# Patient Record
Sex: MALE | Race: WHITE | NOT HISPANIC OR LATINO | Employment: OTHER | URBAN - METROPOLITAN AREA
[De-identification: names, ages, dates, MRNs, and addresses within clinical notes are randomized per-mention and may not be internally consistent; named-entity substitution may affect disease eponyms.]

---

## 2022-10-01 ENCOUNTER — APPOINTMENT (OUTPATIENT)
Dept: CARDIOLOGY | Facility: HOSPITAL | Age: 67
End: 2022-10-01

## 2022-10-01 ENCOUNTER — HOSPITAL ENCOUNTER (EMERGENCY)
Facility: HOSPITAL | Age: 67
Discharge: HOME | End: 2022-10-01
Attending: EMERGENCY MEDICINE | Admitting: EMERGENCY MEDICINE

## 2022-10-01 VITALS
SYSTOLIC BLOOD PRESSURE: 139 MMHG | BODY MASS INDEX: 23.62 KG/M2 | HEART RATE: 63 BPM | WEIGHT: 190 LBS | TEMPERATURE: 98.1 F | RESPIRATION RATE: 18 BRPM | DIASTOLIC BLOOD PRESSURE: 76 MMHG | HEIGHT: 75 IN | OXYGEN SATURATION: 95 %

## 2022-10-01 DIAGNOSIS — I80.03 THROMBOPHLEBITIS OF SUPERFICIAL VEINS OF BOTH LOWER EXTREMITIES: Primary | ICD-10-CM

## 2022-10-01 DIAGNOSIS — E11.65 POORLY CONTROLLED DIABETES MELLITUS: ICD-10-CM

## 2022-10-01 DIAGNOSIS — E86.0 DEHYDRATION: ICD-10-CM

## 2022-10-01 LAB
ALBUMIN SERPL-MCNC: 3.9 G/DL (ref 3.5–5.2)
ALBUMIN/GLOB SERPL: 1.3 G/DL
ALP SERPL-CCNC: 133 U/L (ref 39–117)
ALT SERPL W P-5'-P-CCNC: 9 U/L (ref 1–41)
ANION GAP SERPL CALCULATED.3IONS-SCNC: 9 MMOL/L (ref 5–15)
AST SERPL-CCNC: 13 U/L (ref 1–40)
BASOPHILS # BLD AUTO: 0.08 10*3/MM3 (ref 0–0.2)
BASOPHILS NFR BLD AUTO: 1 % (ref 0–1.5)
BH CV LOW VAS LEFT LESSER SAPH VESSEL: 1
BH CV LOW VAS RIGHT GREATER SAPH BK VESSEL: 1
BH CV LOW VAS RIGHT LESSER SAPH VESSEL: 1
BH CV LOWER VASCULAR LEFT COMMON FEMORAL AUGMENT: NORMAL
BH CV LOWER VASCULAR LEFT COMMON FEMORAL COMPRESS: NORMAL
BH CV LOWER VASCULAR LEFT COMMON FEMORAL PHASIC: NORMAL
BH CV LOWER VASCULAR LEFT COMMON FEMORAL SPONT: NORMAL
BH CV LOWER VASCULAR LEFT DISTAL FEMORAL AUGMENT: NORMAL
BH CV LOWER VASCULAR LEFT DISTAL FEMORAL COMPRESS: NORMAL
BH CV LOWER VASCULAR LEFT DISTAL FEMORAL PHASIC: NORMAL
BH CV LOWER VASCULAR LEFT DISTAL FEMORAL SPONT: NORMAL
BH CV LOWER VASCULAR LEFT GASTRONEMIUS COMPRESS: NORMAL
BH CV LOWER VASCULAR LEFT GREATER SAPH AK COMPRESS: NORMAL
BH CV LOWER VASCULAR LEFT GREATER SAPH BK COMPRESS: NORMAL
BH CV LOWER VASCULAR LEFT LESSER SAPH COMPRESS: NORMAL
BH CV LOWER VASCULAR LEFT LESSER SAPH THROMBUS: NORMAL
BH CV LOWER VASCULAR LEFT MID FEMORAL AUGMENT: NORMAL
BH CV LOWER VASCULAR LEFT MID FEMORAL COMPRESS: NORMAL
BH CV LOWER VASCULAR LEFT MID FEMORAL PHASIC: NORMAL
BH CV LOWER VASCULAR LEFT MID FEMORAL SPONT: NORMAL
BH CV LOWER VASCULAR LEFT PERONEAL COMPRESS: NORMAL
BH CV LOWER VASCULAR LEFT POPLITEAL AUGMENT: NORMAL
BH CV LOWER VASCULAR LEFT POPLITEAL COMPRESS: NORMAL
BH CV LOWER VASCULAR LEFT POPLITEAL PHASIC: NORMAL
BH CV LOWER VASCULAR LEFT POPLITEAL SPONT: NORMAL
BH CV LOWER VASCULAR LEFT POSTERIOR TIBIAL COMPRESS: NORMAL
BH CV LOWER VASCULAR LEFT PROFUNDA FEMORAL AUGMENT: NORMAL
BH CV LOWER VASCULAR LEFT PROFUNDA FEMORAL COMPRESS: NORMAL
BH CV LOWER VASCULAR LEFT PROFUNDA FEMORAL PHASIC: NORMAL
BH CV LOWER VASCULAR LEFT PROFUNDA FEMORAL SPONT: NORMAL
BH CV LOWER VASCULAR LEFT PROXIMAL FEMORAL AUGMENT: NORMAL
BH CV LOWER VASCULAR LEFT PROXIMAL FEMORAL COMPRESS: NORMAL
BH CV LOWER VASCULAR LEFT PROXIMAL FEMORAL PHASIC: NORMAL
BH CV LOWER VASCULAR LEFT PROXIMAL FEMORAL SPONT: NORMAL
BH CV LOWER VASCULAR LEFT SAPHENOFEMORAL JUNCTION AUGMENT: NORMAL
BH CV LOWER VASCULAR LEFT SAPHENOFEMORAL JUNCTION COMPRESS: NORMAL
BH CV LOWER VASCULAR LEFT SAPHENOFEMORAL JUNCTION PHASIC: NORMAL
BH CV LOWER VASCULAR LEFT SAPHENOFEMORAL JUNCTION SPONT: NORMAL
BH CV LOWER VASCULAR LEFT SOLEAL COMPRESS: NORMAL
BH CV LOWER VASCULAR RIGHT COMMON FEMORAL AUGMENT: NORMAL
BH CV LOWER VASCULAR RIGHT COMMON FEMORAL COMPRESS: NORMAL
BH CV LOWER VASCULAR RIGHT COMMON FEMORAL PHASIC: NORMAL
BH CV LOWER VASCULAR RIGHT COMMON FEMORAL SPONT: NORMAL
BH CV LOWER VASCULAR RIGHT DISTAL FEMORAL AUGMENT: NORMAL
BH CV LOWER VASCULAR RIGHT DISTAL FEMORAL COMPRESS: NORMAL
BH CV LOWER VASCULAR RIGHT DISTAL FEMORAL PHASIC: NORMAL
BH CV LOWER VASCULAR RIGHT DISTAL FEMORAL SPONT: NORMAL
BH CV LOWER VASCULAR RIGHT GASTRONEMIUS COMPRESS: NORMAL
BH CV LOWER VASCULAR RIGHT GREATER SAPH AK COMPRESS: NORMAL
BH CV LOWER VASCULAR RIGHT GREATER SAPH BK COMPRESS: NORMAL
BH CV LOWER VASCULAR RIGHT GREATER SAPH BK THROMBUS: NORMAL
BH CV LOWER VASCULAR RIGHT LESSER SAPH COMPRESS: NORMAL
BH CV LOWER VASCULAR RIGHT LESSER SAPH THROMBUS: NORMAL
BH CV LOWER VASCULAR RIGHT MID FEMORAL AUGMENT: NORMAL
BH CV LOWER VASCULAR RIGHT MID FEMORAL COMPRESS: NORMAL
BH CV LOWER VASCULAR RIGHT MID FEMORAL PHASIC: NORMAL
BH CV LOWER VASCULAR RIGHT MID FEMORAL SPONT: NORMAL
BH CV LOWER VASCULAR RIGHT PERONEAL COMPRESS: NORMAL
BH CV LOWER VASCULAR RIGHT POPLITEAL AUGMENT: NORMAL
BH CV LOWER VASCULAR RIGHT POPLITEAL COMPRESS: NORMAL
BH CV LOWER VASCULAR RIGHT POPLITEAL PHASIC: NORMAL
BH CV LOWER VASCULAR RIGHT POPLITEAL SPONT: NORMAL
BH CV LOWER VASCULAR RIGHT POSTERIOR TIBIAL COMPRESS: NORMAL
BH CV LOWER VASCULAR RIGHT PROFUNDA FEMORAL AUGMENT: NORMAL
BH CV LOWER VASCULAR RIGHT PROFUNDA FEMORAL COMPRESS: NORMAL
BH CV LOWER VASCULAR RIGHT PROFUNDA FEMORAL PHASIC: NORMAL
BH CV LOWER VASCULAR RIGHT PROFUNDA FEMORAL SPONT: NORMAL
BH CV LOWER VASCULAR RIGHT PROXIMAL FEMORAL AUGMENT: NORMAL
BH CV LOWER VASCULAR RIGHT PROXIMAL FEMORAL COMPRESS: NORMAL
BH CV LOWER VASCULAR RIGHT PROXIMAL FEMORAL PHASIC: NORMAL
BH CV LOWER VASCULAR RIGHT PROXIMAL FEMORAL SPONT: NORMAL
BH CV LOWER VASCULAR RIGHT SAPHENOFEMORAL JUNCTION AUGMENT: NORMAL
BH CV LOWER VASCULAR RIGHT SAPHENOFEMORAL JUNCTION COMPRESS: NORMAL
BH CV LOWER VASCULAR RIGHT SAPHENOFEMORAL JUNCTION PHASIC: NORMAL
BH CV LOWER VASCULAR RIGHT SAPHENOFEMORAL JUNCTION SPONT: NORMAL
BH CV LOWER VASCULAR RIGHT SOLEAL COMPRESS: NORMAL
BILIRUB SERPL-MCNC: 0.6 MG/DL (ref 0–1.2)
BUN SERPL-MCNC: 16 MG/DL (ref 8–23)
BUN/CREAT SERPL: 9.5 (ref 7–25)
CALCIUM SPEC-SCNC: 9 MG/DL (ref 8.6–10.5)
CHLORIDE SERPL-SCNC: 103 MMOL/L (ref 98–107)
CO2 SERPL-SCNC: 26 MMOL/L (ref 22–29)
CREAT SERPL-MCNC: 1.68 MG/DL (ref 0.76–1.27)
DEPRECATED RDW RBC AUTO: 43.3 FL (ref 37–54)
EGFRCR SERPLBLD CKD-EPI 2021: 44 ML/MIN/1.73
EOSINOPHIL # BLD AUTO: 0.35 10*3/MM3 (ref 0–0.4)
EOSINOPHIL NFR BLD AUTO: 4.3 % (ref 0.3–6.2)
ERYTHROCYTE [DISTWIDTH] IN BLOOD BY AUTOMATED COUNT: 12.4 % (ref 12.3–15.4)
GLOBULIN UR ELPH-MCNC: 3.1 GM/DL
GLUCOSE SERPL-MCNC: 117 MG/DL (ref 65–99)
HCT VFR BLD AUTO: 33.7 % (ref 37.5–51)
HGB BLD-MCNC: 11.5 G/DL (ref 13–17.7)
HOLD SPECIMEN: NORMAL
IMM GRANULOCYTES # BLD AUTO: 0.02 10*3/MM3 (ref 0–0.05)
IMM GRANULOCYTES NFR BLD AUTO: 0.2 % (ref 0–0.5)
LYMPHOCYTES # BLD AUTO: 1.39 10*3/MM3 (ref 0.7–3.1)
LYMPHOCYTES NFR BLD AUTO: 17.2 % (ref 19.6–45.3)
MAXIMAL PREDICTED HEART RATE: 152 BPM
MCH RBC QN AUTO: 32.3 PG (ref 26.6–33)
MCHC RBC AUTO-ENTMCNC: 34.1 G/DL (ref 31.5–35.7)
MCV RBC AUTO: 94.7 FL (ref 79–97)
MONOCYTES # BLD AUTO: 0.82 10*3/MM3 (ref 0.1–0.9)
MONOCYTES NFR BLD AUTO: 10.2 % (ref 5–12)
NEUTROPHILS NFR BLD AUTO: 5.41 10*3/MM3 (ref 1.7–7)
NEUTROPHILS NFR BLD AUTO: 67.1 % (ref 42.7–76)
NRBC BLD AUTO-RTO: 0 /100 WBC (ref 0–0.2)
PLATELET # BLD AUTO: 187 10*3/MM3 (ref 140–450)
PMV BLD AUTO: 10.4 FL (ref 6–12)
POTASSIUM SERPL-SCNC: 4.1 MMOL/L (ref 3.5–5.2)
PROT SERPL-MCNC: 7 G/DL (ref 6–8.5)
RBC # BLD AUTO: 3.56 10*6/MM3 (ref 4.14–5.8)
SODIUM SERPL-SCNC: 138 MMOL/L (ref 136–145)
STRESS TARGET HR: 129 BPM
WBC NRBC COR # BLD: 8.07 10*3/MM3 (ref 3.4–10.8)
WHOLE BLOOD HOLD COAG: NORMAL
WHOLE BLOOD HOLD SPECIMEN: NORMAL

## 2022-10-01 PROCEDURE — 99283 EMERGENCY DEPT VISIT LOW MDM: CPT

## 2022-10-01 PROCEDURE — 80053 COMPREHEN METABOLIC PANEL: CPT | Performed by: EMERGENCY MEDICINE

## 2022-10-01 PROCEDURE — 93970 EXTREMITY STUDY: CPT

## 2022-10-01 PROCEDURE — 85025 COMPLETE CBC W/AUTO DIFF WBC: CPT | Performed by: EMERGENCY MEDICINE

## 2022-10-01 PROCEDURE — 96360 HYDRATION IV INFUSION INIT: CPT

## 2022-10-01 PROCEDURE — 93970 EXTREMITY STUDY: CPT | Performed by: INTERNAL MEDICINE

## 2022-10-01 RX ORDER — INSULIN GLARGINE 100 [IU]/ML
30 INJECTION, SOLUTION SUBCUTANEOUS NIGHTLY
COMMUNITY
End: 2023-01-10

## 2022-10-01 RX ORDER — HYDRALAZINE HYDROCHLORIDE 25 MG/1
25 TABLET, FILM COATED ORAL 2 TIMES DAILY
COMMUNITY
End: 2023-01-26 | Stop reason: SDUPTHER

## 2022-10-01 RX ORDER — PANTOPRAZOLE SODIUM 20 MG/1
20 TABLET, DELAYED RELEASE ORAL DAILY
COMMUNITY
End: 2023-01-26 | Stop reason: SDUPTHER

## 2022-10-01 RX ORDER — GLIPIZIDE 10 MG/1
10 TABLET, FILM COATED, EXTENDED RELEASE ORAL DAILY
COMMUNITY
End: 2022-11-10

## 2022-10-01 RX ORDER — METOPROLOL TARTRATE 50 MG/1
50 TABLET, FILM COATED ORAL 2 TIMES DAILY
COMMUNITY
End: 2023-01-26 | Stop reason: SDUPTHER

## 2022-10-01 RX ORDER — ATORVASTATIN CALCIUM 10 MG/1
10 TABLET, FILM COATED ORAL DAILY
COMMUNITY
End: 2022-11-10

## 2022-10-01 RX ORDER — LEVOTHYROXINE SODIUM 0.1 MG/1
100 TABLET ORAL DAILY
COMMUNITY
End: 2023-01-26 | Stop reason: SDUPTHER

## 2022-10-01 RX ORDER — FOLIC ACID 1 MG/1
1 TABLET ORAL DAILY
COMMUNITY
End: 2023-01-10

## 2022-10-01 RX ORDER — CALCITRIOL 0.25 UG/1
0.25 CAPSULE, LIQUID FILLED ORAL DAILY
COMMUNITY
End: 2023-01-26 | Stop reason: SDUPTHER

## 2022-10-01 RX ORDER — AMLODIPINE BESYLATE 5 MG/1
5 TABLET ORAL 2 TIMES DAILY
COMMUNITY
End: 2023-01-26 | Stop reason: SDUPTHER

## 2022-10-01 RX ORDER — SODIUM CHLORIDE 0.9 % (FLUSH) 0.9 %
10 SYRINGE (ML) INJECTION AS NEEDED
Status: DISCONTINUED | OUTPATIENT
Start: 2022-10-01 | End: 2022-10-01 | Stop reason: HOSPADM

## 2022-10-01 RX ADMIN — SODIUM CHLORIDE 1000 ML: 9 INJECTION, SOLUTION INTRAVENOUS at 08:50

## 2022-10-01 NOTE — ED PROVIDER NOTES
EMERGENCY DEPARTMENT ENCOUNTER    Pt Name: Justice Landeros  MRN: [id number]  Pt :   1955  Room Number:    Date of encounter:  10/1/2022  PCP: Provider, No Known  ED Provider: Patricio Watkins PA-C    Historian: Patient      HPI:  Chief Complaint: Bilateral lower extremity swelling, feels dehydrated        Context: Justice Landeros is a 67 y.o. male who presents to the ED c/o bilateral lower extremity swelling, left calf pain and tenderness, feels dehydrated.  He recently drove here from [facility].  He arrived here 2 nights ago.  He states he started noticing the swelling 3 days ago during his drive up from [place].  No shortness of breath cough chest congestion or hemoptysis, no pleuritic chest or back pain.  No prior history of DVT or PE.  He is an insulin-dependent type 2 diabetic.  He was recently hospitalized at [facility] discharged last week.  He was admitted for dehydration and weakness at that time.      PAST MEDICAL HISTORY  Past Medical History:   Diagnosis Date   • Diabetes mellitus (HCC)    • GERD (gastroesophageal reflux disease)    • Hyperlipidemia    • Hypertension          PAST SURGICAL HISTORY  CABG in     FAMILY HISTORY  Brother with chronic kidney disease    SOCIAL HISTORY  Social History     Socioeconomic History   • Marital status: Single         ALLERGIES  Patient has no known allergies.        REVIEW OF SYSTEMS  Review of Systems   Constitutional: Positive for activity change. Negative for chills, fatigue and fever.   HENT: Negative for congestion, rhinorrhea and sore throat.    Respiratory: Negative for cough, shortness of breath and wheezing.    Cardiovascular: Positive for leg swelling. Negative for chest pain and palpitations.   Gastrointestinal: Negative for abdominal pain, nausea and vomiting.   Genitourinary: Positive for decreased urine volume. Negative for dysuria, flank pain and hematuria.   Musculoskeletal: Negative for arthralgias, back pain, myalgias  and neck pain.   Neurological: Negative for dizziness, syncope and light-headedness.          All systems reviewed and negative except for those discussed in HPI.       PHYSICAL EXAM    I have reviewed the triage vital signs and nursing notes.    ED Triage Vitals   Temp Pulse Resp BP SpO2   -- -- -- -- --      Temp src Heart Rate Source Patient Position BP Location FiO2 (%)   -- -- -- -- --       Physical Exam  GENERAL:   Pleasant awake alert and oriented nontoxic-appearing afebrile hemodynamically stable, not in acute distress.  HENT: Nares patent.  EYES: No scleral icterus.  CV: Regular rhythm, regular rate.  RESPIRATORY: Normal effort.  No audible wheezes, rales or rhonchi.  ABDOMEN: Soft, nontender  MUSCULOSKELETAL: No deformities.  Mild bilateral pretibial edema 1+ on the right 2+ on the left with dusky erythema to bilateral lower extremities.  Left calf tender to palpation, no obvious venous distention.  Neurovascular status intact distally.  NEURO: Alert, moves all extremities, follows commands.  SKIN: Warm, dry, no rash visualized.  Fair turgor.        LAB RESULTS  Recent Results (from the past 24 hour(s))   Comprehensive Metabolic Panel    Collection Time: 10/01/22  8:24 AM    Specimen: Blood   Result Value Ref Range    Glucose 117 (H) 65 - 99 mg/dL    BUN 16 8 - 23 mg/dL    Creatinine 1.68 (H) 0.76 - 1.27 mg/dL    Sodium 138 136 - 145 mmol/L    Potassium 4.1 3.5 - 5.2 mmol/L    Chloride 103 98 - 107 mmol/L    CO2 26.0 22.0 - 29.0 mmol/L    Calcium 9.0 8.6 - 10.5 mg/dL    Total Protein 7.0 6.0 - 8.5 g/dL    Albumin 3.90 3.50 - 5.20 g/dL    ALT (SGPT) 9 1 - 41 U/L    AST (SGOT) 13 1 - 40 U/L    Alkaline Phosphatase 133 (H) 39 - 117 U/L    Total Bilirubin 0.6 0.0 - 1.2 mg/dL    Globulin 3.1 gm/dL    A/G Ratio 1.3 g/dL    BUN/Creatinine Ratio 9.5 7.0 - 25.0    Anion Gap 9.0 5.0 - 15.0 mmol/L    eGFR 44.0 (L) >60.0 mL/min/1.73   CBC Auto Differential    Collection Time: 10/01/22  8:24 AM    Specimen: Blood    Result Value Ref Range    WBC 8.07 3.40 - 10.80 10*3/mm3    RBC 3.56 (L) 4.14 - 5.80 10*6/mm3    Hemoglobin 11.5 (L) 13.0 - 17.7 g/dL    Hematocrit 33.7 (L) 37.5 - 51.0 %    MCV 94.7 79.0 - 97.0 fL    MCH 32.3 26.6 - 33.0 pg    MCHC 34.1 31.5 - 35.7 g/dL    RDW 12.4 12.3 - 15.4 %    RDW-SD 43.3 37.0 - 54.0 fl    MPV 10.4 6.0 - 12.0 fL    Platelets 187 140 - 450 10*3/mm3    Neutrophil % 67.1 42.7 - 76.0 %    Lymphocyte % 17.2 (L) 19.6 - 45.3 %    Monocyte % 10.2 5.0 - 12.0 %    Eosinophil % 4.3 0.3 - 6.2 %    Basophil % 1.0 0.0 - 1.5 %    Immature Grans % 0.2 0.0 - 0.5 %    Neutrophils, Absolute 5.41 1.70 - 7.00 10*3/mm3    Lymphocytes, Absolute 1.39 0.70 - 3.10 10*3/mm3    Monocytes, Absolute 0.82 0.10 - 0.90 10*3/mm3    Eosinophils, Absolute 0.35 0.00 - 0.40 10*3/mm3    Basophils, Absolute 0.08 0.00 - 0.20 10*3/mm3    Immature Grans, Absolute 0.02 0.00 - 0.05 10*3/mm3    nRBC 0.0 0.0 - 0.2 /100 WBC   Green Top (Gel)    Collection Time: 10/01/22  8:24 AM   Result Value Ref Range    Extra Tube Hold for add-ons.    Lavender Top    Collection Time: 10/01/22  8:24 AM   Result Value Ref Range    Extra Tube hold for add-on    Gold Top - SST    Collection Time: 10/01/22  8:24 AM   Result Value Ref Range    Extra Tube Hold for add-ons.    Light Blue Top    Collection Time: 10/01/22  8:24 AM   Result Value Ref Range    Extra Tube Hold for add-ons.    Duplex Venous Lower Extremity - Bilateral CAR    Collection Time: 10/01/22 10:58 AM   Result Value Ref Range    Target HR (85%) 129 bpm    Max. Pred. HR (100%) 152 bpm       If labs were ordered, I independently reviewed the results.        RADIOLOGY  No Radiology Exams Resulted Within Past 24 Hours      PROCEDURES    Procedures    No orders to display       MEDICATIONS GIVEN IN ER    Medications   sodium chloride 0.9 % flush 10 mL (has no administration in time range)   sodium chloride 0.9 % bolus 1,000 mL (0 mL Intravenous Stopped 10/1/22 1000)         PROGRESS, DATA  "ANALYSIS, CONSULTS, AND MEDICAL DECISION MAKING    All labs have been independently reviewed by me.  All radiology studies have been reviewed by me and the radiologist dictating the report.   EKG's have been independently viewed and interpreted by me.      Differential diagnoses: DVT/SVT, dependent edema      ED Course as of 10/01/22 1212   Sat Oct 01, 2022   0850 Creatinine(!): 1.68 [TG]   0850 eGFR(!): 44.0 [TG]      ED Course User Index  [TG] Patricio Watkins PA-C       Per CVL tech - \"This patient has a chronic SVT in the right SSV and a acute SVT in the right GSV below the knee. He also has a mild chronic SVT in the left SSV at the proximal calf. No evidence of DVT in the bilateral lower extremities.\"      AS OF 12:12 EDT VITALS:    BP - 139/76  HR - 63  TEMP - 98.1 °F (36.7 °C) (Oral)  O2 SATS - 95%                  DIAGNOSIS  Final diagnoses:   Thrombophlebitis of superficial veins of both lower extremities   Dehydration   Poorly controlled diabetes mellitus (HCC)         DISPOSITION  DISCHARGE    Patient discharged in stable condition.    Reviewed implications of results, diagnosis, meds, responsibility to follow up, warning signs and symptoms of possible worsening, potential complications and reasons to return to ER.    Patient/Family voiced understanding of above instructions.    Discussed plan for discharge, as there is no emergent indication for admission.  Pt/family is agreeable and understands need for follow up and possible repeat testing.  Pt/family is aware that discharge does not mean that nothing is wrong but that it indicates no emergency is currently present that requires admission and they must continue care with follow-up as given below or with a physician of their choice.     FOLLOW-UP  PATIENT CONNECTION - [facility] [id number]  [phone]  Call   Call to arrange primary care provider         Medication List      No changes were made to your prescriptions during this " visit.                  Patricio Watkins PA-C  10/01/22 [id number]

## 2022-10-01 NOTE — DISCHARGE INSTRUCTIONS
Begin taking aspirin 325 mg daily, warm compresses to lower extremities, and keep elevated is much as possible.  Recommend use of compression stockings to help with edema.  Follow-up with the patient connection number listed above to establish a primary care provider.  Return to the emergency department immediately if any change or worsening of symptoms.

## 2022-10-11 ENCOUNTER — TRANSCRIBE ORDERS (OUTPATIENT)
Dept: ADMINISTRATIVE | Facility: HOSPITAL | Age: 67
End: 2022-10-11

## 2022-10-11 DIAGNOSIS — R22.43 LOCALIZED SWELLING, MASS AND LUMP, LOWER LIMB, BILATERAL: ICD-10-CM

## 2022-10-11 DIAGNOSIS — I73.9 PVD (PERIPHERAL VASCULAR DISEASE): Primary | ICD-10-CM

## 2022-10-11 DIAGNOSIS — I73.9 PVD (PERIPHERAL VASCULAR DISEASE): ICD-10-CM

## 2022-10-12 ENCOUNTER — TRANSCRIBE ORDERS (OUTPATIENT)
Dept: ADMINISTRATIVE | Facility: HOSPITAL | Age: 67
End: 2022-10-12

## 2022-10-12 DIAGNOSIS — I70.203 BILATERAL FEMORAL ARTERY STENOSIS: Primary | ICD-10-CM

## 2022-10-12 DIAGNOSIS — I73.9 PERIPHERAL VASCULAR DISEASE, UNSPECIFIED: Primary | ICD-10-CM

## 2022-11-08 ENCOUNTER — HOSPITAL ENCOUNTER (OUTPATIENT)
Dept: CARDIOLOGY | Facility: HOSPITAL | Age: 67
End: 2022-11-08

## 2022-11-10 ENCOUNTER — OFFICE VISIT (OUTPATIENT)
Dept: FAMILY MEDICINE CLINIC | Facility: CLINIC | Age: 67
End: 2022-11-10

## 2022-11-10 VITALS
WEIGHT: 198.8 LBS | RESPIRATION RATE: 16 BRPM | SYSTOLIC BLOOD PRESSURE: 156 MMHG | HEART RATE: 74 BPM | TEMPERATURE: 97.8 F | DIASTOLIC BLOOD PRESSURE: 76 MMHG | HEIGHT: 75 IN | OXYGEN SATURATION: 97 % | BODY MASS INDEX: 24.72 KG/M2

## 2022-11-10 DIAGNOSIS — Z79.4 CONTROLLED TYPE 2 DIABETES MELLITUS WITH CHRONIC KIDNEY DISEASE, WITH LONG-TERM CURRENT USE OF INSULIN, UNSPECIFIED CKD STAGE: Primary | ICD-10-CM

## 2022-11-10 DIAGNOSIS — K21.9 GASTROESOPHAGEAL REFLUX DISEASE WITHOUT ESOPHAGITIS: ICD-10-CM

## 2022-11-10 DIAGNOSIS — I10 ESSENTIAL HYPERTENSION: ICD-10-CM

## 2022-11-10 DIAGNOSIS — I25.10 CORONARY ARTERY DISEASE INVOLVING NATIVE CORONARY ARTERY OF NATIVE HEART WITHOUT ANGINA PECTORIS: ICD-10-CM

## 2022-11-10 DIAGNOSIS — I73.9 PAD (PERIPHERAL ARTERY DISEASE): ICD-10-CM

## 2022-11-10 DIAGNOSIS — E11.22 CONTROLLED TYPE 2 DIABETES MELLITUS WITH CHRONIC KIDNEY DISEASE, WITH LONG-TERM CURRENT USE OF INSULIN, UNSPECIFIED CKD STAGE: Primary | ICD-10-CM

## 2022-11-10 DIAGNOSIS — E78.2 MIXED HYPERLIPIDEMIA: ICD-10-CM

## 2022-11-10 DIAGNOSIS — E03.9 HYPOTHYROIDISM, UNSPECIFIED TYPE: ICD-10-CM

## 2022-11-10 DIAGNOSIS — N18.32 STAGE 3B CHRONIC KIDNEY DISEASE: ICD-10-CM

## 2022-11-10 PROBLEM — E55.9 VITAMIN D DEFICIENCY: Status: ACTIVE | Noted: 2019-04-01

## 2022-11-10 PROBLEM — M54.2 CHRONIC NECK PAIN: Status: ACTIVE | Noted: 2021-01-26

## 2022-11-10 PROBLEM — G89.29 CHRONIC NECK PAIN: Status: ACTIVE | Noted: 2021-01-26

## 2022-11-10 PROBLEM — M62.830 SPASM OF BACK MUSCLES: Status: ACTIVE | Noted: 2019-04-02

## 2022-11-10 PROBLEM — D50.9 IRON DEFICIENCY ANEMIA: Status: ACTIVE | Noted: 2019-04-01

## 2022-11-10 PROBLEM — N18.30 STAGE 3 CHRONIC KIDNEY DISEASE: Status: ACTIVE | Noted: 2021-01-26

## 2022-11-10 PROBLEM — N20.0 KIDNEY STONE: Status: ACTIVE | Noted: 2020-01-23

## 2022-11-10 PROBLEM — N18.30 STAGE 3 CHRONIC KIDNEY DISEASE: Status: RESOLVED | Noted: 2021-01-26 | Resolved: 2022-11-10

## 2022-11-10 PROBLEM — E66.3 OVERWEIGHT WITH BODY MASS INDEX (BMI) 25.0-29.9: Status: ACTIVE | Noted: 2021-09-17

## 2022-11-10 PROBLEM — M62.830 SPASM OF BACK MUSCLES: Status: RESOLVED | Noted: 2019-04-02 | Resolved: 2022-11-10

## 2022-11-10 PROBLEM — M51.36 DEGENERATION OF LUMBAR INTERVERTEBRAL DISC: Status: ACTIVE | Noted: 2019-04-01

## 2022-11-10 PROBLEM — I27.20 PULMONARY HYPERTENSION: Status: ACTIVE | Noted: 2021-07-26

## 2022-11-10 PROBLEM — M51.369 DEGENERATION OF LUMBAR INTERVERTEBRAL DISC: Status: ACTIVE | Noted: 2019-04-01

## 2022-11-10 LAB
EXPIRATION DATE: NORMAL
HBA1C MFR BLD: 7.9 %
Lab: NORMAL

## 2022-11-10 PROCEDURE — 3046F HEMOGLOBIN A1C LEVEL >9.0%: CPT | Performed by: STUDENT IN AN ORGANIZED HEALTH CARE EDUCATION/TRAINING PROGRAM

## 2022-11-10 PROCEDURE — 3051F HG A1C>EQUAL 7.0%<8.0%: CPT | Performed by: STUDENT IN AN ORGANIZED HEALTH CARE EDUCATION/TRAINING PROGRAM

## 2022-11-10 PROCEDURE — 83036 HEMOGLOBIN GLYCOSYLATED A1C: CPT | Performed by: STUDENT IN AN ORGANIZED HEALTH CARE EDUCATION/TRAINING PROGRAM

## 2022-11-10 PROCEDURE — 3052F HG A1C>EQUAL 8.0%<EQUAL 9.0%: CPT | Performed by: STUDENT IN AN ORGANIZED HEALTH CARE EDUCATION/TRAINING PROGRAM

## 2022-11-10 PROCEDURE — 99205 OFFICE O/P NEW HI 60 MIN: CPT | Performed by: STUDENT IN AN ORGANIZED HEALTH CARE EDUCATION/TRAINING PROGRAM

## 2022-11-10 PROCEDURE — 3044F HG A1C LEVEL LT 7.0%: CPT | Performed by: STUDENT IN AN ORGANIZED HEALTH CARE EDUCATION/TRAINING PROGRAM

## 2022-11-10 RX ORDER — GABAPENTIN 100 MG/1
CAPSULE ORAL
COMMUNITY
Start: 2022-11-04 | End: 2022-11-10

## 2022-11-10 RX ORDER — RIVAROXABAN 2.5 MG/1
2.5 TABLET, FILM COATED ORAL
COMMUNITY
Start: 2022-10-18 | End: 2022-11-10 | Stop reason: SDUPTHER

## 2022-11-10 RX ORDER — LOSARTAN POTASSIUM 50 MG/1
TABLET ORAL
COMMUNITY
Start: 2022-10-01 | End: 2022-11-10

## 2022-11-10 RX ORDER — CYCLOBENZAPRINE HCL 10 MG
TABLET ORAL EVERY 12 HOURS SCHEDULED
COMMUNITY
End: 2022-11-10

## 2022-11-10 RX ORDER — BLOOD-GLUCOSE METER
KIT MISCELLANEOUS
Qty: 1 EACH | Refills: 0 | Status: SHIPPED | OUTPATIENT
Start: 2022-11-10

## 2022-11-10 RX ORDER — CEPHALEXIN 500 MG/1
500 CAPSULE ORAL 3 TIMES DAILY
COMMUNITY
Start: 2022-09-10 | End: 2022-11-10

## 2022-11-10 RX ORDER — ATORVASTATIN CALCIUM 80 MG/1
TABLET, FILM COATED ORAL
COMMUNITY
End: 2023-01-26 | Stop reason: SDUPTHER

## 2022-11-10 RX ORDER — FUROSEMIDE 20 MG/1
20 TABLET ORAL
COMMUNITY
Start: 2022-10-14 | End: 2022-12-08 | Stop reason: SDUPTHER

## 2022-11-10 RX ORDER — SEMAGLUTIDE 1.34 MG/ML
INJECTION, SOLUTION SUBCUTANEOUS
COMMUNITY
End: 2022-11-10

## 2022-11-10 RX ORDER — LOSARTAN POTASSIUM 25 MG/1
25 TABLET ORAL
COMMUNITY
Start: 2022-10-14 | End: 2022-11-17 | Stop reason: SDUPTHER

## 2022-11-10 RX ORDER — LANCETS 28 GAUGE
EACH MISCELLANEOUS
Qty: 60 EACH | Refills: 5 | Status: SHIPPED | OUTPATIENT
Start: 2022-11-10 | End: 2023-01-16

## 2022-11-10 RX ORDER — RIVAROXABAN 2.5 MG/1
2.5 TABLET, FILM COATED ORAL 2 TIMES DAILY
Qty: 180 TABLET | Refills: 1 | Status: SHIPPED | OUTPATIENT
Start: 2022-11-10 | End: 2022-11-15

## 2022-11-10 NOTE — ASSESSMENT & PLAN NOTE
-Continue Lantus 30 units nightly. (Check fasting morning blood sugars for up titration)  -Likely a GLP-1 next visit given known CKD

## 2022-11-10 NOTE — ASSESSMENT & PLAN NOTE
Continue losartan 25 mg, hydralazine 25 mg twice daily, metoprolol 50 mg twice daily, amlodipine 5 mg.    He will confirm his medication dosing and we will adjust as needed next visit

## 2022-11-10 NOTE — ASSESSMENT & PLAN NOTE
He does not tell me that this is his diagnosis but seems most consistent with exam today  Okay to continue Lasix 40 mg daily  Recommend light compression  Get results from [facility] and then refer to vascular surgery  -Continue Xarelto

## 2022-11-10 NOTE — PROGRESS NOTES
New Patient Office Visit      Subjective      Chief Complaint:  Diabetes (Patient just moved back from [place], he is retired, he has had swelling of his lower extremities along with cellulitis, they have went down some and he said his numbers have been good, needing glucometer and supplies today)      History of Present Illness: Justice Landeros is a 67 y.o. male who presents for a new patient visit.      Patient with likely PAD, CAD with main concern of leg swelling and pain today.  Worsening pain with walking in the cast.  He has redness to the lower legs bilaterally.    Based on his history it sounds like he was admitted for concern for possible leg cellulitis in [place] couple months ago.  [facility].    Recently seen in ED last month for superficial vein thrombosis.    10/1/2022 labs showed creatinine 1.68 GFR 44.0.  Hemoglobin 11.5.  MCV 94.     Reviewed discharge paperwork from care everywhere from CABG in       Past Medical History:   Diagnosis Date   • Diabetes mellitus (HCC)    • GERD (gastroesophageal reflux disease)    • Hyperlipidemia    • Hypertension        Past Surgical History:   Procedure Laterality Date   • CORONARY ARTERY BYPASS GRAFT      . [facility]       History reviewed. No pertinent family history.    Social History     Socioeconomic History   • Marital status: Single   Tobacco Use   • Smoking status: Former     Packs/day: 1.00     Types: Cigarettes     Start date: 1975     Quit date: 1985     Years since quittin.9   Vaping Use   • Vaping Use: Never used   Substance and Sexual Activity   • Alcohol use: Never   • Drug use: Never   • Sexual activity: Defer         Current Outpatient Medications:   •  amLODIPine (NORVASC) 5 MG tablet, Take 5 mg by mouth 2 (Two) Times a Day., Disp: , Rfl:   •  calcitriol (ROCALTROL) 0.25 MCG capsule, Take 0.25 mcg by mouth Daily. Monday, Wednesday, & Friday, Disp: , Rfl:   •  folic acid (FOLVITE) 1 MG tablet, Take 1 mg  "by mouth Daily., Disp: , Rfl:   •  furosemide (LASIX) 20 MG tablet, 1 tablet. Take two tablets by mouth every morning, Disp: , Rfl:   •  hydrALAZINE (APRESOLINE) 25 MG tablet, Take 25 mg by mouth 2 (Two) Times a Day., Disp: , Rfl:   •  insulin glargine (LANTUS, SEMGLEE) 100 UNIT/ML injection, Inject 30 Units under the skin into the appropriate area as directed Every Night., Disp: , Rfl:   •  levothyroxine (SYNTHROID, LEVOTHROID) 100 MCG tablet, Take 100 mcg by mouth Daily., Disp: , Rfl:   •  metoprolol tartrate (LOPRESSOR) 50 MG tablet, Take 50 mg by mouth 2 (Two) Times a Day., Disp: , Rfl:   •  pantoprazole (PROTONIX) 20 MG EC tablet, Take 20 mg by mouth Daily., Disp: , Rfl:   •  atorvastatin (LIPITOR) 80 MG tablet, atorvastatin 80 mg tablet  TAKE 1 TABLET BY MOUTH  DAILY, Disp: , Rfl:   •  Blood Glucose Monitoring Suppl (FreeStyle Lite) device, Test daily before all meals/snacks and once before bedtime., Disp: 1 each, Rfl: 0  •  glucose blood (FREESTYLE LITE) test strip, Test daily before breakfast and as needed, Disp: 60 each, Rfl: 5  •  Lancets (freestyle) lancets, Test daily before breakfast and as needed, Disp: 60 each, Rfl: 5  •  losartan (COZAAR) 25 MG tablet, Take 1 tablet by mouth Daily. Take one tablet by mouth daily, Disp: 90 tablet, Rfl: 3  •  Xarelto 2.5 MG tablet, Take 1 tablet by mouth 2 (Two) Times a Day., Disp: 180 tablet, Rfl: 1    No Known Allergies    Objective     Physical Exam:  Vital Signs:  /76 (BP Location: Left arm, Patient Position: Sitting, Cuff Size: Adult)   Pulse 74   Temp 97.8 °F (36.6 °C) (Temporal)   Resp 16   Ht 190 cm (74.8\")   Wt 90.2 kg (198 lb 12.8 oz)   SpO2 97%   BMI 24.98 kg/m²      Physical Exam  Constitutional:       General: He is not in acute distress.     Appearance: He is not ill-appearing.   Cardiovascular:      Rate and Rhythm: Normal rate and regular rhythm.   Pulmonary:      Effort: Pulmonary effort is normal.      Breath sounds: Normal breath sounds. "   Musculoskeletal:      Right foot: No deformity.      Left foot: No deformity.   Feet:      Right foot:      Skin integrity: Skin integrity normal.      Toenail Condition: Right toenails are normal.      Left foot:      Skin integrity: Skin integrity normal.      Toenail Condition: Left toenails are normal.      Comments: Diabetic Foot Exam Performed  Decreased sensation to toes bilaterally.   Dryness to skin of feet.   Decreased dorsalis pedis pulses bilaterally  Neurological:      Mental Status: He is alert.   Psychiatric:         Thought Content: Thought content normal.     diffuse mild brawny edema with erythema and flaking skin to the lower legs bilaterally.   Mild scab to the left lateral malleolus         Assessment / Plan      Assessment/Plan:   Diagnoses and all orders for this visit:    1. Controlled type 2 diabetes mellitus with chronic kidney disease, with long-term current use of insulin, unspecified CKD stage ([facility]) (Primary)  Assessment & Plan:  -Continue Lantus 30 units nightly. (Check fasting morning blood sugars for up titration)  -Likely a GLP-1 next visit given known CKD    Orders:  -     POC Glycosylated Hemoglobin (Hb A1C)  -     Comprehensive Metabolic Panel; Future  -     Microalbumin / Creatinine Urine Ratio - Urine, Clean Catch; Future  -     Blood Glucose Monitoring Suppl (FreeStyle Lite) device; Test daily before all meals/snacks and once before bedtime.  Dispense: 1 each; Refill: 0  -     glucose blood (FREESTYLE LITE) test strip; Test daily before breakfast and as needed  Dispense: 60 each; Refill: 5  -     Lancets (freestyle) lancets; Test daily before breakfast and as needed  Dispense: 60 each; Refill: 5    2. Stage 3b chronic kidney disease (HCC)  Assessment & Plan:  - Check urine microscopy and urine microalbumin  -Continue losartan    Orders:  -     Urinalysis With Microscopic - Urine, Clean Catch; Future    3. Hypothyroidism, unspecified type  Assessment & Plan:  Continue  Synthroid and check TSH    Orders:  -     Cancel: TSH  -     TSH; Future    4. Mixed hyperlipidemia  Assessment & Plan:  Continue atorvastatin      5. Gastroesophageal reflux disease without esophagitis  Assessment & Plan:  Continue Protonix      6. Essential hypertension  Assessment & Plan:  Continue losartan 25 mg, hydralazine 25 mg twice daily, metoprolol 50 mg twice daily, amlodipine 5 mg.    He will confirm his medication dosing and we will adjust as needed next visit      7. Coronary artery disease involving native coronary artery of native heart without angina pectoris  Assessment & Plan:  Status post CABG in 2009.  Continue Xarelto, metoprolol and losartan    Orders:  -     Lipid Panel; Future    8. PAD (peripheral artery disease) ([facility])  Assessment & Plan:  He does not tell me that this is his diagnosis but seems most consistent with exam today  Okay to continue Lasix 40 mg daily  Recommend light compression  Get results from [facility] and then refer to vascular surgery  -Continue Xarelto    Orders:  -     Discontinue: Xarelto 2.5 MG tablet; Take 1 tablet by mouth 2 (Two) Times a Day. Takes one by mouth daily  Dispense: 180 tablet; Refill: 1    Given colonoscopy was over 10 years ago we will be unable to get colonoscopy records    Request records from [facility] and from [facility] physician group    Follow Up:   Return in about 2 weeks (around 11/24/2022) for Medicare Wellness.    MDM: I spent 61 minutes caring for Justice on this date of service. This time includes time spent by me in the following activities: preparing for the visit, performing a medically appropriate examination and/or evaluation, counseling and educating the patient/family/caregiver and documenting information in the medical record.  Multiple chronic problems some of which are uncontrolled requiring further evaluation.  Also review emergency department and previous records from care everywhere.    Mario Posada MD  [facility]    Addendum 11/22/2022  Pending GFR we want to  Add SGLT2 inhibitor given CKD.  He was actually previously on Stephen Posada MD  Family Medicine - [facility]

## 2022-11-15 DIAGNOSIS — I73.9 PAD (PERIPHERAL ARTERY DISEASE): ICD-10-CM

## 2022-11-15 RX ORDER — RIVAROXABAN 2.5 MG/1
2.5 TABLET, FILM COATED ORAL 2 TIMES DAILY
Qty: 180 TABLET | Refills: 1 | Status: SHIPPED | OUTPATIENT
Start: 2022-11-15 | End: 2023-01-26 | Stop reason: SDUPTHER

## 2022-11-17 RX ORDER — LOSARTAN POTASSIUM 25 MG/1
25 TABLET ORAL DAILY
Qty: 90 TABLET | Refills: 3 | Status: SHIPPED | OUTPATIENT
Start: 2022-11-17

## 2022-11-17 NOTE — TELEPHONE ENCOUNTER
Rx Refill Note  Requested Prescriptions     Pending Prescriptions Disp Refills   • losartan (COZAAR) 25 MG tablet       Si tablet. Take one tablet by mouth daily      Last office visit with prescribing clinician: 11/10/2022      Next office visit with prescribing clinician: 2022            [place] MA  22, 09:14 EST

## 2022-11-22 ENCOUNTER — LAB (OUTPATIENT)
Dept: LAB | Facility: HOSPITAL | Age: 67
End: 2022-11-22

## 2022-11-22 ENCOUNTER — TELEPHONE (OUTPATIENT)
Dept: FAMILY MEDICINE CLINIC | Facility: CLINIC | Age: 67
End: 2022-11-22

## 2022-11-22 DIAGNOSIS — N18.32 STAGE 3B CHRONIC KIDNEY DISEASE: ICD-10-CM

## 2022-11-22 DIAGNOSIS — E03.9 HYPOTHYROIDISM, UNSPECIFIED TYPE: ICD-10-CM

## 2022-11-22 DIAGNOSIS — E11.22 CONTROLLED TYPE 2 DIABETES MELLITUS WITH CHRONIC KIDNEY DISEASE, WITH LONG-TERM CURRENT USE OF INSULIN, UNSPECIFIED CKD STAGE: ICD-10-CM

## 2022-11-22 DIAGNOSIS — I25.10 CORONARY ARTERY DISEASE INVOLVING NATIVE CORONARY ARTERY OF NATIVE HEART WITHOUT ANGINA PECTORIS: ICD-10-CM

## 2022-11-22 DIAGNOSIS — Z79.4 CONTROLLED TYPE 2 DIABETES MELLITUS WITH CHRONIC KIDNEY DISEASE, WITH LONG-TERM CURRENT USE OF INSULIN, UNSPECIFIED CKD STAGE: ICD-10-CM

## 2022-11-22 LAB
ALBUMIN SERPL-MCNC: 4.1 G/DL (ref 3.5–5.2)
ALBUMIN UR-MCNC: 95.8 MG/DL
ALBUMIN/GLOB SERPL: 1.4 G/DL
ALP SERPL-CCNC: 104 U/L (ref 39–117)
ALT SERPL W P-5'-P-CCNC: 11 U/L (ref 1–41)
ANION GAP SERPL CALCULATED.3IONS-SCNC: 14.2 MMOL/L (ref 5–15)
AST SERPL-CCNC: 15 U/L (ref 1–40)
BACTERIA UR QL AUTO: NORMAL /HPF
BILIRUB SERPL-MCNC: 0.5 MG/DL (ref 0–1.2)
BILIRUB UR QL STRIP: NEGATIVE
BUN SERPL-MCNC: 20 MG/DL (ref 8–23)
BUN/CREAT SERPL: 9.3 (ref 7–25)
CALCIUM SPEC-SCNC: 10 MG/DL (ref 8.6–10.5)
CHLORIDE SERPL-SCNC: 101 MMOL/L (ref 98–107)
CHOLEST SERPL-MCNC: 99 MG/DL (ref 0–200)
CLARITY UR: CLEAR
CO2 SERPL-SCNC: 23.8 MMOL/L (ref 22–29)
COLOR UR: YELLOW
CREAT SERPL-MCNC: 2.14 MG/DL (ref 0.76–1.27)
CREAT UR-MCNC: 28.5 MG/DL
EGFRCR SERPLBLD CKD-EPI 2021: 32.9 ML/MIN/1.73
GLOBULIN UR ELPH-MCNC: 3 GM/DL
GLUCOSE SERPL-MCNC: 110 MG/DL (ref 65–99)
GLUCOSE UR STRIP-MCNC: ABNORMAL MG/DL
HDLC SERPL-MCNC: 40 MG/DL (ref 40–60)
HGB UR QL STRIP.AUTO: NEGATIVE
HYALINE CASTS UR QL AUTO: NORMAL /LPF
KETONES UR QL STRIP: NEGATIVE
LDLC SERPL CALC-MCNC: 39 MG/DL (ref 0–100)
LDLC/HDLC SERPL: 0.93 {RATIO}
LEUKOCYTE ESTERASE UR QL STRIP.AUTO: NEGATIVE
MICROALBUMIN/CREAT UR: 3361.4 MG/G
NITRITE UR QL STRIP: NEGATIVE
PH UR STRIP.AUTO: 7 [PH] (ref 5–8)
POTASSIUM SERPL-SCNC: 5.3 MMOL/L (ref 3.5–5.2)
PROT SERPL-MCNC: 7.1 G/DL (ref 6–8.5)
PROT UR QL STRIP: ABNORMAL
RBC # UR STRIP: NORMAL /HPF
REF LAB TEST METHOD: NORMAL
SODIUM SERPL-SCNC: 139 MMOL/L (ref 136–145)
SP GR UR STRIP: 1.01 (ref 1–1.03)
SQUAMOUS #/AREA URNS HPF: NORMAL /HPF
TRIGL SERPL-MCNC: 110 MG/DL (ref 0–150)
TSH SERPL DL<=0.05 MIU/L-ACNC: 0.82 UIU/ML (ref 0.27–4.2)
UROBILINOGEN UR QL STRIP: ABNORMAL
VLDLC SERPL-MCNC: 20 MG/DL (ref 5–40)
WBC # UR STRIP: NORMAL /HPF

## 2022-11-22 PROCEDURE — 81001 URINALYSIS AUTO W/SCOPE: CPT

## 2022-11-22 PROCEDURE — 80053 COMPREHEN METABOLIC PANEL: CPT

## 2022-11-22 PROCEDURE — 82570 ASSAY OF URINE CREATININE: CPT

## 2022-11-22 PROCEDURE — 80061 LIPID PANEL: CPT

## 2022-11-22 PROCEDURE — 84443 ASSAY THYROID STIM HORMONE: CPT

## 2022-11-22 PROCEDURE — 82043 UR ALBUMIN QUANTITATIVE: CPT

## 2022-11-22 NOTE — TELEPHONE ENCOUNTER
Please call patient.    I received the records from his primary care provider but never received records from [facility].  Please call the [facility] records department and asked them to fax us his most recent imaging results and discharge summary.  Our fax number is [phone].    We are trying to get these records to help facilitate the vascular specialist referral.     Mario Posada MD  Family Medicine - [facility]

## 2022-11-28 NOTE — TELEPHONE ENCOUNTER
Please call patient with message below. Correction to below phone message and instructions.  I want the PATIENT  to call the hospital to request records that we have already requested.    Mario Posada MD  Family Medicine - [facility]

## 2022-11-30 PROBLEM — R80.8 OTHER PROTEINURIA: Status: ACTIVE | Noted: 2022-11-30

## 2022-12-08 ENCOUNTER — TELEPHONE (OUTPATIENT)
Dept: FAMILY MEDICINE CLINIC | Facility: CLINIC | Age: 67
End: 2022-12-08

## 2022-12-08 DIAGNOSIS — I73.9 PAD (PERIPHERAL ARTERY DISEASE): Primary | ICD-10-CM

## 2022-12-08 DIAGNOSIS — M79.89 LEG SWELLING: ICD-10-CM

## 2022-12-08 RX ORDER — FUROSEMIDE 20 MG/1
40 TABLET ORAL DAILY
Qty: 30 TABLET | Refills: 2 | Status: SHIPPED | OUTPATIENT
Start: 2022-12-08 | End: 2023-01-10

## 2022-12-08 NOTE — TELEPHONE ENCOUNTER
Caller: Justice Landeros    Relationship: Self    Best call back number: [phone]    What was the call regarding: PATIENT CALLED IN STATING THAT THE PHARMACY TOLD HIM THAT HIS MEDICATIONS NEED SIGNATURES FROM HIS DOCTOR IN ORDER IN ORDER TO BE FILLED. furosemide (LASIX) 20 MG tablet IS ONE OF THE MEDICATIONS, PATIENT CAN NOT RECALL WHAT OTHER MEDICATIONS HE NEEDS AT THE MOMENT.     Do you require a callback: YES

## 2023-01-10 ENCOUNTER — LAB (OUTPATIENT)
Dept: LAB | Facility: HOSPITAL | Age: 68
End: 2023-01-10
Payer: MEDICARE

## 2023-01-10 ENCOUNTER — OFFICE VISIT (OUTPATIENT)
Dept: FAMILY MEDICINE CLINIC | Facility: CLINIC | Age: 68
End: 2023-01-10
Payer: MEDICARE

## 2023-01-10 VITALS
HEIGHT: 75 IN | SYSTOLIC BLOOD PRESSURE: 140 MMHG | RESPIRATION RATE: 18 BRPM | WEIGHT: 205 LBS | HEART RATE: 67 BPM | DIASTOLIC BLOOD PRESSURE: 78 MMHG | BODY MASS INDEX: 25.49 KG/M2 | TEMPERATURE: 98 F | OXYGEN SATURATION: 97 %

## 2023-01-10 DIAGNOSIS — M79.89 LEG SWELLING: ICD-10-CM

## 2023-01-10 DIAGNOSIS — Z79.4 CONTROLLED TYPE 2 DIABETES MELLITUS WITH STAGE 3 CHRONIC KIDNEY DISEASE, WITH LONG-TERM CURRENT USE OF INSULIN: ICD-10-CM

## 2023-01-10 DIAGNOSIS — N18.32 STAGE 3B CHRONIC KIDNEY DISEASE: Primary | ICD-10-CM

## 2023-01-10 DIAGNOSIS — I73.9 PAD (PERIPHERAL ARTERY DISEASE): ICD-10-CM

## 2023-01-10 DIAGNOSIS — E11.22 CONTROLLED TYPE 2 DIABETES MELLITUS WITH STAGE 3 CHRONIC KIDNEY DISEASE, WITH LONG-TERM CURRENT USE OF INSULIN: ICD-10-CM

## 2023-01-10 DIAGNOSIS — N18.32 STAGE 3B CHRONIC KIDNEY DISEASE: ICD-10-CM

## 2023-01-10 DIAGNOSIS — N18.30 CONTROLLED TYPE 2 DIABETES MELLITUS WITH STAGE 3 CHRONIC KIDNEY DISEASE, WITH LONG-TERM CURRENT USE OF INSULIN: ICD-10-CM

## 2023-01-10 LAB
BILIRUB UR QL STRIP: NEGATIVE
CLARITY UR: CLEAR
COLOR UR: YELLOW
GLUCOSE UR STRIP-MCNC: ABNORMAL MG/DL
HGB UR QL STRIP.AUTO: NEGATIVE
KETONES UR QL STRIP: NEGATIVE
LEUKOCYTE ESTERASE UR QL STRIP.AUTO: NEGATIVE
NITRITE UR QL STRIP: NEGATIVE
PH UR STRIP.AUTO: 6.5 [PH] (ref 5–8)
PROT UR QL STRIP: ABNORMAL
SP GR UR STRIP: 1.01 (ref 1–1.03)
UROBILINOGEN UR QL STRIP: ABNORMAL

## 2023-01-10 PROCEDURE — 36415 COLL VENOUS BLD VENIPUNCTURE: CPT

## 2023-01-10 PROCEDURE — 80053 COMPREHEN METABOLIC PANEL: CPT | Performed by: STUDENT IN AN ORGANIZED HEALTH CARE EDUCATION/TRAINING PROGRAM

## 2023-01-10 PROCEDURE — 86803 HEPATITIS C AB TEST: CPT

## 2023-01-10 PROCEDURE — 81001 URINALYSIS AUTO W/SCOPE: CPT

## 2023-01-10 PROCEDURE — 99214 OFFICE O/P EST MOD 30 MIN: CPT | Performed by: STUDENT IN AN ORGANIZED HEALTH CARE EDUCATION/TRAINING PROGRAM

## 2023-01-10 RX ORDER — DAPAGLIFLOZIN 5 MG/1
5 TABLET, FILM COATED ORAL DAILY
Qty: 28 TABLET | Refills: 0 | COMMUNITY
Start: 2023-01-10 | End: 2023-02-07

## 2023-01-10 RX ORDER — DAPAGLIFLOZIN 5 MG/1
5 TABLET, FILM COATED ORAL DAILY
Qty: 30 TABLET | Refills: 1 | Status: SHIPPED | OUTPATIENT
Start: 2023-01-10 | End: 2023-01-10

## 2023-01-10 RX ORDER — GLIPIZIDE 10 MG/1
TABLET, FILM COATED, EXTENDED RELEASE ORAL
COMMUNITY
Start: 2022-11-14 | End: 2023-01-10

## 2023-01-10 RX ORDER — INSULIN GLARGINE 100 [IU]/ML
50 INJECTION, SOLUTION SUBCUTANEOUS
Status: SHIPPED | COMMUNITY
Start: 2023-01-10 | End: 2023-01-26 | Stop reason: SDUPTHER

## 2023-01-10 RX ORDER — BLOOD-GLUCOSE METER
EACH MISCELLANEOUS
COMMUNITY
Start: 2022-11-14

## 2023-01-10 RX ORDER — INSULIN GLARGINE 100 [IU]/ML
INJECTION, SOLUTION SUBCUTANEOUS
COMMUNITY
Start: 2022-10-25 | End: 2023-01-10

## 2023-01-10 RX ORDER — FUROSEMIDE 20 MG/1
20 TABLET ORAL DAILY
Qty: 30 TABLET | Refills: 2 | Status: SHIPPED
Start: 2023-01-10 | End: 2023-01-26 | Stop reason: SDUPTHER

## 2023-01-10 NOTE — PROGRESS NOTES
Established Patient Office Visit        Subjective      Chief Complaint:  Diabetes (Follow up on diabetes)      History of Present Illness: Justice Landeros is a 67 y.o. male who presents for diabetes CKD.    Fasting morning glucose is 70-s to 80's   Taking lantus 30 BID.     Renal ultrasound shows medical renal disease on outside records GFR 32 in November,     Patient is overall feeling better from last visit    Patient Active Problem List   Diagnosis   • CAD (coronary artery disease)   • Controlled type 2 diabetes mellitus with chronic kidney disease, with long-term current use of insulin ([facility])   • Stage 3b chronic kidney disease ([facility])   • Chronic low back pain   • Chronic neck pain   • Degeneration of lumbar intervertebral disc   • ED (erectile dysfunction)   • Encounter for long-term (current) use of insulin ([facility])   • Essential hypertension   • Gastroesophageal reflux disease   • HLD (hyperlipidemia)   • Mixed hyperlipidemia   • Hypothyroidism   • Iron deficiency anemia   • Kidney stone   • Overweight with body mass index (BMI) 25.0-29.9   • Postsurgical aortocoronary bypass status   • Pulmonary hypertension ([facility])   • Vitamin D deficiency   • PAD (peripheral artery disease) ([facility])   • Other proteinuria         Current Outpatient Medications:   •  amLODIPine (NORVASC) 5 MG tablet, Take 5 mg by mouth 2 (Two) Times a Day. Takes one by mouth daily, Disp: , Rfl:   •  atorvastatin (LIPITOR) 80 MG tablet, atorvastatin 80 mg tablet  TAKE 1 TABLET BY MOUTH  DAILY, Disp: , Rfl:   •  Blood Glucose Monitoring Suppl (FreeStyle Lite) device, Test daily before all meals/snacks and once before bedtime., Disp: 1 each, Rfl: 0  •  Blood Glucose Monitoring Suppl (ONE TOUCH ULTRA 2) w/Device kit, , Disp: , Rfl:   •  calcitriol (ROCALTROL) 0.25 MCG capsule, Take 0.25 mcg by mouth Daily. Monday, Wednesday, & Friday, Disp: , Rfl:   •  dapagliflozin (Farxiga) 5 MG tablet tablet, Take 1 tablet by mouth Daily., Disp: 28 tablet, Rfl: 0  •   furosemide (LASIX) 20 MG tablet, Take 1 tablet by mouth Daily. Take two tablets by mouth every morning, Disp: 30 tablet, Rfl: 2  •  glucose blood (FREESTYLE LITE) test strip, Test daily before breakfast and as needed, Disp: 60 each, Rfl: 5  •  hydrALAZINE (APRESOLINE) 25 MG tablet, Take 25 mg by mouth 2 (Two) Times a Day., Disp: , Rfl:   •  insulin glargine (LANTUS, SEMGLEE) 100 UNIT/ML injection, Inject 50 Units under the skin into the appropriate area as directed., Disp: , Rfl:   •  Lancets (freestyle) lancets, Test daily before breakfast and as needed, Disp: 60 each, Rfl: 5  •  levothyroxine (SYNTHROID, LEVOTHROID) 100 MCG tablet, Take 100 mcg by mouth Daily., Disp: , Rfl:   •  losartan (COZAAR) 25 MG tablet, Take 1 tablet by mouth Daily. Take one tablet by mouth daily, Disp: 90 tablet, Rfl: 3  •  metoprolol tartrate (LOPRESSOR) 50 MG tablet, Take 50 mg by mouth 2 (Two) Times a Day., Disp: , Rfl:   •  pantoprazole (PROTONIX) 20 MG EC tablet, Take 20 mg by mouth Daily., Disp: , Rfl:   •  Xarelto 2.5 MG tablet, Take 1 tablet by mouth 2 (Two) Times a Day., Disp: 180 tablet, Rfl: 1       Objective     Physical Exam:   Vital Signs:   /78 (BP Location: Left arm, Patient Position: Sitting, Cuff Size: Adult)   Pulse 67   Temp 98 °F (36.7 °C) (Temporal)   Resp 18   Ht 190 cm (74.8\")   Wt 93 kg (205 lb)   SpO2 97%   BMI 25.76 kg/m²      Physical Exam  Constitutional:       General: He is not in acute distress.     Appearance: He is not ill-appearing.   Pulmonary:      Effort: Pulmonary effort is normal.   Neurological:      Mental Status: He is alert.   Psychiatric:         Thought Content: Thought content normal.   Mild brawny edema to lower extremities bilaterally         Assessment / Plan      Assessment/Plan:   Diagnoses and all orders for this visit:    1. Stage 3b chronic kidney disease (HCC) (Primary)  Assessment & Plan:  Renal ultrasound shows medical renal disease on outside records GFR 32 in  November  +proteniuria   -Continue losartan could increase as tolerated  -Add Farxiga 5 mg for now and plan to uptitrate to 10 mg at follow-up if tolerating    Orders:  -     Comprehensive Metabolic Panel  -     Hepatitis C antibody; Future  -     Urinalysis With Microscopic - Urine, Clean Catch; Future  -     Cancel: US Renal Bilateral; Future  -     Discontinue: dapagliflozin (Farxiga) 5 MG tablet tablet; Take 1 tablet by mouth Daily.  Dispense: 30 tablet; Refill: 1  -     Ambulatory Referral to Nephrology  -     dapagliflozin (Farxiga) 5 MG tablet tablet; Take 1 tablet by mouth Daily.  Dispense: 28 tablet; Refill: 0    2. PAD (peripheral artery disease) ([facility])  Assessment & Plan:  Continue Xarelto  Refer to vascular surgery  Decrease Lasix to 20 mg daily and use compression    Orders:  -     Cancel: Ambulatory Referral to Vascular Surgery  -     furosemide (LASIX) 20 MG tablet; Take 1 tablet by mouth Daily. Take two tablets by mouth every morning  Dispense: 30 tablet; Refill: 2  -     Ambulatory Referral to Vascular Surgery    3. Leg swelling  -     furosemide (LASIX) 20 MG tablet; Take 1 tablet by mouth Daily. Take two tablets by mouth every morning  Dispense: 30 tablet; Refill: 2  -     Ambulatory Referral to Vascular Surgery    4. Controlled type 2 diabetes mellitus with stage 3 chronic kidney disease, with long-term current use of insulin ([facility])  Assessment & Plan:  - Decrease Lantus from 30 units twice daily down to 50 units nightly given some hypoglycemia (instructed go down to 45 if any further hypoglycemia)  -Add Farxiga given CKD proteinuria and diabetes.  Discussed risk of Farxiga including genital infection very rare risk of Dickson gangrene and very low risk of increased risk of amputation.  Discussed risk of hypoglycemia and will start Farxiga about 3 days after switching insulin dosing    Orders:  -     Discontinue: dapagliflozin (Farxiga) 5 MG tablet tablet; Take 1 tablet by mouth Daily.  Dispense:  30 tablet; Refill: 1  -     Ambulatory Referral to Nephrology  -     dapagliflozin (Farxiga) 5 MG tablet tablet; Take 1 tablet by mouth Daily.  Dispense: 28 tablet; Refill: 0     Follow-up in a month.    Follow Up:   Return in about 4 weeks (around 2/7/2023) for Follow-up.      MDM:     Mario Posada MD  Family Medicine - [facility]

## 2023-01-10 NOTE — ASSESSMENT & PLAN NOTE
Renal ultrasound shows medical renal disease on outside records GFR 32 in November  +proteniuria   -Continue losartan could increase as tolerated  -Add Farxiga 5 mg for now and plan to uptitrate to 10 mg at follow-up if tolerating

## 2023-01-10 NOTE — ASSESSMENT & PLAN NOTE
- Decrease Lantus from 30 units twice daily down to 50 units nightly given some hypoglycemia (instructed go down to 45 if any further hypoglycemia)  -Add Farxiga given CKD proteinuria and diabetes.  Discussed risk of Farxiga including genital infection very rare risk of Dickson gangrene and very low risk of increased risk of amputation.  Discussed risk of hypoglycemia and will start Farxiga about 3 days after switching insulin dosing

## 2023-01-10 NOTE — PATIENT INSTRUCTIONS
Decrease lantus to 50 units nightly. Decrease to 45 in low blood sugar.     Start farxiga.     Decrease lasix to one tab daily.     Some of the best fruits for diabetics include:    Blueberries - recommended as superfoods by the American Diabetes Association.  Raspberries  Strawberries  Apples  Pears  Apricots  Grapefruit  Each of these fruits offers different benefits, but all are low in sugar. Fresh fruit consumption provides important vitamins like vitamin C and minerals that are essential for diabetes management and are beneficial for a healthy diet.    It's important to eat a variety of different fresh fruits in order to get the most benefit. So mix it up and try something new every day! You can even enjoy some fruit as part of a healthy snack or dessert.    Low-GI Fruits  Pay attention to the fruit you eat that has a low glycemic index. The GI is an indicator of how quickly a food raises blood sugar levels. A food with a high GI will raise blood sugar more rapidly than a food with a low GI, like most fruits and vegetables.    Fresh fruit is a wonderful way to get plenty of the nutrients your body needs. Fresh produce has a high fiber content, which helps to keep most of them low on the GI scale (55 or under).    Apple  Josephine  Strawberries  Dates  Mangoes  Peaches  Grapes  Cherries  Nectarines

## 2023-01-11 LAB
ALBUMIN SERPL-MCNC: 4 G/DL (ref 3.5–5.2)
ALBUMIN/GLOB SERPL: 1.3 G/DL
ALP SERPL-CCNC: 126 U/L (ref 39–117)
ALT SERPL W P-5'-P-CCNC: 13 U/L (ref 1–41)
ANION GAP SERPL CALCULATED.3IONS-SCNC: 12 MMOL/L (ref 5–15)
AST SERPL-CCNC: 18 U/L (ref 1–40)
BACTERIA UR QL AUTO: NORMAL /HPF
BILIRUB SERPL-MCNC: 0.4 MG/DL (ref 0–1.2)
BUN SERPL-MCNC: 25 MG/DL (ref 8–23)
BUN/CREAT SERPL: 10.8 (ref 7–25)
CALCIUM SPEC-SCNC: 9 MG/DL (ref 8.6–10.5)
CHLORIDE SERPL-SCNC: 100 MMOL/L (ref 98–107)
CO2 SERPL-SCNC: 22 MMOL/L (ref 22–29)
CREAT SERPL-MCNC: 2.31 MG/DL (ref 0.76–1.27)
EGFRCR SERPLBLD CKD-EPI 2021: 30 ML/MIN/1.73
GLOBULIN UR ELPH-MCNC: 3 GM/DL
GLUCOSE SERPL-MCNC: 259 MG/DL (ref 65–99)
HCV AB SER DONR QL: NORMAL
HYALINE CASTS UR QL AUTO: NORMAL /LPF
POTASSIUM SERPL-SCNC: 4.6 MMOL/L (ref 3.5–5.2)
PROT SERPL-MCNC: 7 G/DL (ref 6–8.5)
RBC # UR STRIP: NORMAL /HPF
REF LAB TEST METHOD: NORMAL
SODIUM SERPL-SCNC: 134 MMOL/L (ref 136–145)
SQUAMOUS #/AREA URNS HPF: NORMAL /HPF
WBC # UR STRIP: NORMAL /HPF

## 2023-01-15 DIAGNOSIS — E11.22 CONTROLLED TYPE 2 DIABETES MELLITUS WITH CHRONIC KIDNEY DISEASE, WITH LONG-TERM CURRENT USE OF INSULIN, UNSPECIFIED CKD STAGE: ICD-10-CM

## 2023-01-15 DIAGNOSIS — Z79.4 CONTROLLED TYPE 2 DIABETES MELLITUS WITH CHRONIC KIDNEY DISEASE, WITH LONG-TERM CURRENT USE OF INSULIN, UNSPECIFIED CKD STAGE: ICD-10-CM

## 2023-01-16 RX ORDER — LANCETS 33 GAUGE
EACH MISCELLANEOUS
Qty: 300 EACH | Refills: 3 | Status: SHIPPED | OUTPATIENT
Start: 2023-01-16 | End: 2023-02-17 | Stop reason: SDUPTHER

## 2023-01-16 RX ORDER — BLOOD SUGAR DIAGNOSTIC
STRIP MISCELLANEOUS
Qty: 300 EACH | Refills: 3 | Status: SHIPPED | OUTPATIENT
Start: 2023-01-16

## 2023-01-16 NOTE — TELEPHONE ENCOUNTER
Rx Refill Note  Requested Prescriptions     Pending Prescriptions Disp Refills   • OneTouch Ultra test strip [Pharmacy Med Name: OneTouch Ultra In Vitro Strip]  3     Sig: CHECK BLOOD SUGAR 3 TIMES DAILY   • Lancets (OneTouch Delica Plus [id number]) misc [Pharmacy Med Name: OneTouch Delica Plus [id number]] 300 each 3     Sig: CHECK BLOOD SUGAR 3 TIMES DAILY      Last office visit with prescribing clinician: 1/10/2023   Last telemedicine visit with prescribing clinician: 2/7/2023   Next office visit with prescribing clinician: 2/7/2023                         Would you like a call back once the refill request has been completed: [] Yes [] No    If the office needs to give you a call back, can they leave a voicemail: [] Yes [] No    Ruthy Bustillos  01/16/23, 09:21 EST

## 2023-01-26 ENCOUNTER — TELEPHONE (OUTPATIENT)
Dept: FAMILY MEDICINE CLINIC | Facility: CLINIC | Age: 68
End: 2023-01-26
Payer: MEDICARE

## 2023-01-26 DIAGNOSIS — N18.30 CONTROLLED TYPE 2 DIABETES MELLITUS WITH STAGE 3 CHRONIC KIDNEY DISEASE, WITH LONG-TERM CURRENT USE OF INSULIN: Primary | ICD-10-CM

## 2023-01-26 DIAGNOSIS — I73.9 PAD (PERIPHERAL ARTERY DISEASE): ICD-10-CM

## 2023-01-26 DIAGNOSIS — E11.22 CONTROLLED TYPE 2 DIABETES MELLITUS WITH STAGE 3 CHRONIC KIDNEY DISEASE, WITH LONG-TERM CURRENT USE OF INSULIN: Primary | ICD-10-CM

## 2023-01-26 DIAGNOSIS — M79.89 LEG SWELLING: ICD-10-CM

## 2023-01-26 DIAGNOSIS — Z79.4 CONTROLLED TYPE 2 DIABETES MELLITUS WITH STAGE 3 CHRONIC KIDNEY DISEASE, WITH LONG-TERM CURRENT USE OF INSULIN: Primary | ICD-10-CM

## 2023-01-26 RX ORDER — AMLODIPINE BESYLATE 5 MG/1
5 TABLET ORAL 2 TIMES DAILY
Qty: 180 TABLET | Refills: 3 | Status: SHIPPED | OUTPATIENT
Start: 2023-01-26

## 2023-01-26 RX ORDER — ATORVASTATIN CALCIUM 80 MG/1
80 TABLET, FILM COATED ORAL NIGHTLY
Qty: 90 TABLET | Refills: 3 | Status: SHIPPED | OUTPATIENT
Start: 2023-01-26

## 2023-01-26 RX ORDER — DAPAGLIFLOZIN 10 MG/1
10 TABLET, FILM COATED ORAL DAILY
Qty: 90 TABLET | Refills: 3 | Status: SHIPPED | OUTPATIENT
Start: 2023-01-26 | End: 2023-02-07

## 2023-01-26 RX ORDER — FUROSEMIDE 20 MG/1
20 TABLET ORAL DAILY
Qty: 90 TABLET | Refills: 3
Start: 2023-01-26 | End: 2023-02-07

## 2023-01-26 RX ORDER — HYDRALAZINE HYDROCHLORIDE 25 MG/1
25 TABLET, FILM COATED ORAL 2 TIMES DAILY
Qty: 180 TABLET | Refills: 3 | Status: SHIPPED | OUTPATIENT
Start: 2023-01-26

## 2023-01-26 RX ORDER — LEVOTHYROXINE SODIUM 0.1 MG/1
100 TABLET ORAL DAILY
Qty: 90 TABLET | Refills: 3 | Status: SHIPPED | OUTPATIENT
Start: 2023-01-26

## 2023-01-26 RX ORDER — INSULIN GLARGINE 100 [IU]/ML
50 INJECTION, SOLUTION SUBCUTANEOUS NIGHTLY
Qty: 45 ML | Refills: 3 | Status: SHIPPED | OUTPATIENT
Start: 2023-01-26 | End: 2023-02-07

## 2023-01-26 RX ORDER — CALCITRIOL 0.25 UG/1
0.25 CAPSULE, LIQUID FILLED ORAL DAILY
Qty: 90 CAPSULE | Refills: 3 | Status: SHIPPED | OUTPATIENT
Start: 2023-01-26

## 2023-01-26 RX ORDER — PANTOPRAZOLE SODIUM 20 MG/1
20 TABLET, DELAYED RELEASE ORAL DAILY
Qty: 90 TABLET | Refills: 3 | Status: SHIPPED | OUTPATIENT
Start: 2023-01-26

## 2023-01-26 RX ORDER — METOPROLOL TARTRATE 50 MG/1
50 TABLET, FILM COATED ORAL 2 TIMES DAILY
Qty: 90 TABLET | Refills: 3 | Status: SHIPPED | OUTPATIENT
Start: 2023-01-26

## 2023-01-26 NOTE — TELEPHONE ENCOUNTER
Pt came into the office confused as to why [facility] had not sent his medications. After calling [facility] he founded out that he is needing new prescriptions for all his medications except for his losartan, test strips, and lancets. He wanted to know why they were not refilled since he spoke with the doctor about his medications at his last visit. He requested a call back once the medications were sent. Please advise.

## 2023-01-26 NOTE — TELEPHONE ENCOUNTER
should now be refilled.  I will also send an increased dose of Farxiga.    Mario Posada MD  Family Medicine - [facility]

## 2023-01-27 ENCOUNTER — TELEPHONE (OUTPATIENT)
Dept: FAMILY MEDICINE CLINIC | Facility: CLINIC | Age: 68
End: 2023-01-27
Payer: MEDICARE

## 2023-01-27 NOTE — TELEPHONE ENCOUNTER
[facility] IS REQUESTING A  RF FOR GABAPENTIN. NO STRENGTH INFO WAS GIVEN. I ALSO DID NOT SEE THIS ON HIS MED LIST.

## 2023-01-27 NOTE — TELEPHONE ENCOUNTER
Hub/front can read     Decline rx.  Patient will need to discuss in person as I have never prescribed this to him before and is not on med list

## 2023-02-01 ENCOUNTER — TELEPHONE (OUTPATIENT)
Dept: FAMILY MEDICINE CLINIC | Facility: CLINIC | Age: 68
End: 2023-02-01

## 2023-02-01 NOTE — TELEPHONE ENCOUNTER
Caller: Justice Landeros    Relationship: Self    Best call back number: [phone]    What is the best time to reach you: ANY    Who are you requesting to speak with (clinical staff, provider,  specific staff member): DR. JIMÉNEZ AND HIS     What was the call regarding: PATIENT IS CALLING BECAUSE HE IS WANTING TO KNOW IF HE IS SUPPOSED TO HAVE RECEIVED A CALL FOR A KIDNEY DOCTOR THAT HE WAS REFERRED TO. PLEASE CALL HIM TO LET HIM KNOW WHAT HE SHOULD DO. PATIENT IS SCHEDULED FOR AN APPOINTMENT ON 02.07.23.    Do you require a callback: YES ASAP.

## 2023-02-02 NOTE — TELEPHONE ENCOUNTER
I called nephrology and left a message with their referral coordinator, we faxed our referral to them on 1/16

## 2023-02-07 ENCOUNTER — OFFICE VISIT (OUTPATIENT)
Dept: FAMILY MEDICINE CLINIC | Facility: CLINIC | Age: 68
End: 2023-02-07
Payer: MEDICARE

## 2023-02-07 VITALS
DIASTOLIC BLOOD PRESSURE: 68 MMHG | SYSTOLIC BLOOD PRESSURE: 134 MMHG | HEART RATE: 65 BPM | BODY MASS INDEX: 25.81 KG/M2 | TEMPERATURE: 97.3 F | OXYGEN SATURATION: 98 % | WEIGHT: 207.6 LBS | HEIGHT: 75 IN | RESPIRATION RATE: 14 BRPM

## 2023-02-07 DIAGNOSIS — Z79.4 TYPE 2 DIABETES MELLITUS WITH DIABETIC NEUROPATHY, WITH LONG-TERM CURRENT USE OF INSULIN: ICD-10-CM

## 2023-02-07 DIAGNOSIS — Z79.4 CONTROLLED TYPE 2 DIABETES MELLITUS WITH STAGE 3 CHRONIC KIDNEY DISEASE, WITH LONG-TERM CURRENT USE OF INSULIN: ICD-10-CM

## 2023-02-07 DIAGNOSIS — E11.40 TYPE 2 DIABETES MELLITUS WITH DIABETIC NEUROPATHY, WITH LONG-TERM CURRENT USE OF INSULIN: ICD-10-CM

## 2023-02-07 DIAGNOSIS — I73.9 PAD (PERIPHERAL ARTERY DISEASE): ICD-10-CM

## 2023-02-07 DIAGNOSIS — E11.22 CONTROLLED TYPE 2 DIABETES MELLITUS WITH STAGE 3 CHRONIC KIDNEY DISEASE, WITH LONG-TERM CURRENT USE OF INSULIN: ICD-10-CM

## 2023-02-07 DIAGNOSIS — M79.89 LEG SWELLING: ICD-10-CM

## 2023-02-07 DIAGNOSIS — Z79.4 CONTROLLED TYPE 2 DIABETES MELLITUS WITH CHRONIC KIDNEY DISEASE, WITH LONG-TERM CURRENT USE OF INSULIN, UNSPECIFIED CKD STAGE: ICD-10-CM

## 2023-02-07 DIAGNOSIS — N18.32 STAGE 3B CHRONIC KIDNEY DISEASE: ICD-10-CM

## 2023-02-07 DIAGNOSIS — L97.919 BILATERAL LEG ULCER, WITH UNSPECIFIED SEVERITY: ICD-10-CM

## 2023-02-07 DIAGNOSIS — N18.30 CONTROLLED TYPE 2 DIABETES MELLITUS WITH STAGE 3 CHRONIC KIDNEY DISEASE, WITH LONG-TERM CURRENT USE OF INSULIN: ICD-10-CM

## 2023-02-07 DIAGNOSIS — L97.929 BILATERAL LEG ULCER, WITH UNSPECIFIED SEVERITY: ICD-10-CM

## 2023-02-07 DIAGNOSIS — R80.9 PROTEINURIA, UNSPECIFIED TYPE: ICD-10-CM

## 2023-02-07 DIAGNOSIS — Z12.5 SCREENING FOR PROSTATE CANCER: ICD-10-CM

## 2023-02-07 DIAGNOSIS — E11.22 CONTROLLED TYPE 2 DIABETES MELLITUS WITH CHRONIC KIDNEY DISEASE, WITH LONG-TERM CURRENT USE OF INSULIN, UNSPECIFIED CKD STAGE: ICD-10-CM

## 2023-02-07 PROCEDURE — 99214 OFFICE O/P EST MOD 30 MIN: CPT | Performed by: STUDENT IN AN ORGANIZED HEALTH CARE EDUCATION/TRAINING PROGRAM

## 2023-02-07 RX ORDER — INSULIN GLARGINE 100 [IU]/ML
40 INJECTION, SOLUTION SUBCUTANEOUS NIGHTLY
Qty: 45 ML | Refills: 3 | Status: SHIPPED | OUTPATIENT
Start: 2023-02-07

## 2023-02-07 RX ORDER — FUROSEMIDE 20 MG/1
40 TABLET ORAL DAILY
Qty: 180 TABLET | Refills: 3 | Status: SHIPPED
Start: 2023-02-07 | End: 2023-02-27 | Stop reason: SDUPTHER

## 2023-02-07 RX ORDER — GABAPENTIN 100 MG/1
100 CAPSULE ORAL 3 TIMES DAILY
Qty: 90 CAPSULE | Refills: 2 | Status: SHIPPED | OUTPATIENT
Start: 2023-02-07 | End: 2023-02-09

## 2023-02-07 NOTE — PROGRESS NOTES
Established Patient Office Visit        Subjective      Chief Complaint:  Diabetes (Diabetes follow up, patient had an allergic reaction to medication he started taking, Said Dr. Arciniega saw him for a similar allergic reaction with blisters and swelling in legs. He said he took this for 21 days and then stopped. He still has remaining wounds from this. Needing a refill of gabapentin. Declines all vaccines)      History of Present Illness: Justice Landeros is a 67 y.o. male who presents for follow-up of diabetes.    Some lowest in 80s and 90's.  He is taking between 30 to 50 units at night and alternating based on blood sugars at nighttime    He had some worsening swelling and blister formation since decreasing Lasix and starting Farxiga so he increased the Lasix again to 40 mg and stop the Farxiga and his symptoms are improving but has some residual scabs    Patient Active Problem List   Diagnosis   • CAD (coronary artery disease)   • Controlled type 2 diabetes mellitus with chronic kidney disease, with long-term current use of insulin ([facility])   • Stage 3b chronic kidney disease ([facility])   • Chronic low back pain   • Chronic neck pain   • Degeneration of lumbar intervertebral disc   • ED (erectile dysfunction)   • Encounter for long-term (current) use of insulin ([facility])   • Essential hypertension   • Gastroesophageal reflux disease   • HLD (hyperlipidemia)   • Mixed hyperlipidemia   • Hypothyroidism   • Iron deficiency anemia   • Kidney stone   • Overweight with body mass index (BMI) 25.0-29.9   • Postsurgical aortocoronary bypass status   • Pulmonary hypertension ([facility])   • Vitamin D deficiency   • PAD (peripheral artery disease) ([facility])   • Other proteinuria         Current Outpatient Medications:   •  amLODIPine (NORVASC) 5 MG tablet, Take 1 tablet by mouth 2 (Two) Times a Day. Takes one by mouth daily, Disp: 180 tablet, Rfl: 3  •  atorvastatin (LIPITOR) 80 MG tablet, Take 1 tablet by mouth Every Night., Disp: 90 tablet, Rfl:  "3  •  Blood Glucose Monitoring Suppl (FreeStyle Lite) device, Test daily before all meals/snacks and once before bedtime., Disp: 1 each, Rfl: 0  •  Blood Glucose Monitoring Suppl (ONE TOUCH ULTRA 2) w/Device kit, , Disp: , Rfl:   •  calcitriol (ROCALTROL) 0.25 MCG capsule, Take 1 capsule by mouth Daily. Monday, Wednesday, & Friday, Disp: 90 capsule, Rfl: 3  •  furosemide (LASIX) 20 MG tablet, Take 2 tablets by mouth Daily., Disp: 180 tablet, Rfl: 3  •  hydrALAZINE (APRESOLINE) 25 MG tablet, Take 1 tablet by mouth 2 (Two) Times a Day., Disp: 180 tablet, Rfl: 3  •  insulin glargine (LANTUS, SEMGLEE) 100 UNIT/ML injection, Inject 40 Units under the skin into the appropriate area as directed Every Night., Disp: 45 mL, Rfl: 3  •  Lancets (OneTouch Delica Plus [id number]) misc, CHECK BLOOD SUGAR 3 TIMES DAILY, Disp: 300 each, Rfl: 3  •  levothyroxine (SYNTHROID, LEVOTHROID) 100 MCG tablet, Take 1 tablet by mouth Daily., Disp: 90 tablet, Rfl: 3  •  losartan (COZAAR) 25 MG tablet, Take 1 tablet by mouth Daily. Take one tablet by mouth daily, Disp: 90 tablet, Rfl: 3  •  metoprolol tartrate (LOPRESSOR) 50 MG tablet, Take 1 tablet by mouth 2 (Two) Times a Day., Disp: 90 tablet, Rfl: 3  •  OneTouch Ultra test strip, CHECK BLOOD SUGAR 3 TIMES DAILY, Disp: 300 each, Rfl: 3  •  pantoprazole (PROTONIX) 20 MG EC tablet, Take 1 tablet by mouth Daily., Disp: 90 tablet, Rfl: 3  •  Rivaroxaban (Xarelto) 2.5 MG tablet, Take 1 tablet by mouth 2 (Two) Times a Day., Disp: 180 tablet, Rfl: 3  •  empagliflozin (Jardiance) 10 MG tablet tablet, Take 1 tablet by mouth Daily., Disp: 30 tablet, Rfl: 1  •  gabapentin (NEURONTIN) 100 MG capsule, Take 1 capsule by mouth 3 (Three) Times a Day., Disp: 90 capsule, Rfl: 2       Objective     Physical Exam:   Vital Signs:   /68   Pulse 65   Temp 97.3 °F (36.3 °C)   Resp 14   Ht 190 cm (74.8\")   Wt 94.2 kg (207 lb 9.6 oz)   SpO2 98%   BMI 26.09 kg/m²      Physical Exam  Constitutional:       " General: He is not in acute distress.     Appearance: He is not ill-appearing.   Cardiovascular:      Rate and Rhythm: Well-perfused  Pulmonary:        Effort: Pulmonary effort is normal.   Neurological:      Mental Status: He is alert.   Psychiatric:         Thought Content: Thought content normal.   No lesions to feet   Mutliple scabs to bilateral legs with swelling to legs and feet and erythema bilaterally.  No drainage         Assessment / Plan      Assessment/Plan:   Diagnoses and all orders for this visit:    1. Controlled type 2 diabetes mellitus with stage 3 chronic kidney disease, with long-term current use of insulin ([facility])  Assessment & Plan:  - He has been doing a little bit of variability in how much insulin he is taking at night but it seems 40 units at night keep sugar little bit above 100 units.  -Take 40 units Lantus nightly  -Has swelling and lesions develop since her last visit when we decreased the Lasix and started Farxiga I think this is triggered by Lasix rather than Farxiga but will continue to hold Farxiga  -Trial Jardiance 10 mg and uptitrate for benefit of CKD with proteinuria  -Check A1c after the 10th of this month  -Continue healthy diet    Orders:  -     insulin glargine (LANTUS, SEMGLEE) 100 UNIT/ML injection; Inject 40 Units under the skin into the appropriate area as directed Every Night.  Dispense: 45 mL; Refill: 3  -     Comprehensive Metabolic Panel  -     Hemoglobin A1c  -     empagliflozin (Jardiance) 10 MG tablet tablet; Take 1 tablet by mouth Daily.  Dispense: 30 tablet; Refill: 1    2. PAD (peripheral artery disease) ([facility])  Assessment & Plan:  - Recommend compression and I will refer him to wound care for recent ulcerations that are healing and currently scabbed.  -Lasix 40 mg given significant worsening when decreased to Lasix 20 mg    Orders:  -     furosemide (LASIX) 20 MG tablet; Take 2 tablets by mouth Daily.  Dispense: 180 tablet; Refill: 3  -     Ambulatory Referral to  Wound Clinic    3. Leg swelling  -     furosemide (LASIX) 20 MG tablet; Take 2 tablets by mouth Daily.  Dispense: 180 tablet; Refill: 3    4. Controlled type 2 diabetes mellitus with chronic kidney disease, with long-term current use of insulin, unspecified CKD stage ([facility])  Assessment & Plan:  - He has been doing a little bit of variability in how much insulin he is taking at night but it seems 40 units at night keep sugar little bit above 100 units.  -Take 40 units Lantus nightly  -Has swelling and lesions develop since her last visit when we decreased the Lasix and started Farxiga I think this is triggered by Lasix rather than Farxiga but will continue to hold Farxiga  -Trial Jardiance 10 mg and uptitrate for benefit of CKD with proteinuria  -Check A1c after the 10th of this month  -Continue healthy diet      5. Stage 3b chronic kidney disease (HCC)  Assessment & Plan:  Continue losartan, start Jardiance  Keep appoint with nephrology    Orders:  -     empagliflozin (Jardiance) 10 MG tablet tablet; Take 1 tablet by mouth Daily.  Dispense: 30 tablet; Refill: 1    6. Bilateral leg ulcer, with unspecified severity ([facility])  -     Ambulatory Referral to Wound Clinic    7. Screening for prostate cancer  -     PSA SCREENING    8. Proteinuria, unspecified type  -     empagliflozin (Jardiance) 10 MG tablet tablet; Take 1 tablet by mouth Daily.  Dispense: 30 tablet; Refill: 1    9. Type 2 diabetes mellitus with diabetic neuropathy, with long-term current use of insulin ([facility])  -     gabapentin (NEURONTIN) 100 MG capsule; Take 1 capsule by mouth 3 (Three) Times a Day.  Dispense: 90 capsule; Refill: 2     Start gabapentin discussed risk,     Follow Up:   Return in about 4 weeks (around 3/7/2023) for Medicare Wellness.      MDM:     Mario Posada MD  Family Medicine - [facility]

## 2023-02-07 NOTE — ASSESSMENT & PLAN NOTE
- Recommend compression and I will refer him to wound care for recent ulcerations that are healing and currently scabbed.  -Lasix 40 mg given significant worsening when decreased to Lasix 20 mg

## 2023-02-07 NOTE — ASSESSMENT & PLAN NOTE
- He has been doing a little bit of variability in how much insulin he is taking at night but it seems 40 units at night keep sugar little bit above 100 units.  -Take 40 units Lantus nightly  -Has swelling and lesions develop since her last visit when we decreased the Lasix and started Farxiga I think this is triggered by Lasix rather than Farxiga but will continue to hold Farxiga  -Trial Jardiance 10 mg and uptitrate for benefit of CKD with proteinuria  -Check A1c after the 10th of this month  -Continue healthy diet

## 2023-02-08 ENCOUNTER — TELEPHONE (OUTPATIENT)
Dept: FAMILY MEDICINE CLINIC | Facility: CLINIC | Age: 68
End: 2023-02-08
Payer: MEDICARE

## 2023-02-08 NOTE — TELEPHONE ENCOUNTER
Pt came in explaining that his prescriptions for Jardiance and gabapentin were sent to [facility] pharmacy by mistake. He is needing both of those prescriptions sent to [facility]. He would like all of his prescriptions sent to [facility] from now on.

## 2023-02-09 RX ORDER — GABAPENTIN 100 MG/1
100 CAPSULE ORAL 3 TIMES DAILY
Qty: 90 CAPSULE | Refills: 2 | Status: SHIPPED | OUTPATIENT
Start: 2023-02-09 | End: 2023-03-15

## 2023-02-10 ENCOUNTER — HOSPITAL ENCOUNTER (OUTPATIENT)
Dept: PHYSICAL THERAPY | Facility: HOSPITAL | Age: 68
Setting detail: THERAPIES SERIES
Discharge: HOME | End: 2023-02-10
Payer: MEDICARE

## 2023-02-10 DIAGNOSIS — I87.2 VENOUS INSUFFICIENCY OF BOTH LOWER EXTREMITIES: ICD-10-CM

## 2023-02-10 DIAGNOSIS — S81.801D MULTIPLE OPEN WOUNDS OF LOWER LEG, RIGHT, SUBSEQUENT ENCOUNTER: Primary | ICD-10-CM

## 2023-02-10 DIAGNOSIS — S81.802D MULTIPLE OPEN WOUNDS OF LOWER LEG, LEFT, SUBSEQUENT ENCOUNTER: ICD-10-CM

## 2023-02-10 DIAGNOSIS — R60.0 BILATERAL EDEMA OF LOWER EXTREMITY: ICD-10-CM

## 2023-02-10 PROCEDURE — 97162 PT EVAL MOD COMPLEX 30 MIN: CPT

## 2023-02-10 PROCEDURE — 97597 DBRDMT OPN WND 1ST 20 CM/<: CPT

## 2023-02-10 PROCEDURE — 29581 APPL MULTLAYER CMPRN SYS LEG: CPT

## 2023-02-10 NOTE — THERAPY EVALUATION
Outpatient Rehabilitation - Wound/Debridement Initial Ez   [place]     Patient Name: Justice Landeros  : 1955  MRN: [id number]  Today's Date: 2/10/2023                  Admit Date: 2/10/2023    Visit Dx:    ICD-10-CM ICD-9-CM   1. Multiple open wounds of lower leg, right, subsequent encounter  S81.801D V58.89     894.0   2. Multiple open wounds of lower leg, left, subsequent encounter  S81.802D V58.89     894.0   3. Bilateral edema of lower extremity  R60.0 782.3   4. Venous insufficiency of both lower extremities  I87.2 459.81     BLEs      R anterior leg      R posterior leg      L medial leg        Patient Active Problem List   Diagnosis   • CAD (coronary artery disease)   • Controlled type 2 diabetes mellitus with chronic kidney disease, with long-term current use of insulin ([facility])   • Stage 3b chronic kidney disease ([facility])   • Chronic low back pain   • Chronic neck pain   • Degeneration of lumbar intervertebral disc   • ED (erectile dysfunction)   • Encounter for long-term (current) use of insulin ([facility])   • Essential hypertension   • Gastroesophageal reflux disease   • HLD (hyperlipidemia)   • Mixed hyperlipidemia   • Hypothyroidism   • Iron deficiency anemia   • Kidney stone   • Overweight with body mass index (BMI) 25.0-29.9   • Postsurgical aortocoronary bypass status   • Pulmonary hypertension ([facility])   • Vitamin D deficiency   • PAD (peripheral artery disease) ([facility])   • Other proteinuria        Past Medical History:   Diagnosis Date   • Diabetes mellitus ([facility])    • GERD (gastroesophageal reflux disease)    • Hyperlipidemia    • Hypertension         Past Surgical History:   Procedure Laterality Date   • CORONARY ARTERY BYPASS GRAFT      . [facility]        Patient History     Row Name 02/10/23 1300             History    Chief Complaint Swelling;Ulcer, wound or other skin conditions  -      Date Current Problem(s) Began --  approximately 1 week ago  -      Brief  Description of Current Complaint Pt reports he has a history of BLE edema and vitamin D deficency. Pt was prescribed Farxiga which he had a reaction to which resulted in severe BLE swelling and blisters that led to open wounds. After stopping Farxiga his edema has improved.  -      Previous treatment for THIS PROBLEM Medication  Lasix  -      Patient/Caregiver Goals Heal wound;Decrease swelling  -      Patient/Caregiver Goals Comment Wound healing and edema mangement  -      Patient's Rating of General Health Good  -      Patient seeing anyone else for problem(s)? Dr. Posada  -         Pain     Pain at Present 0;2  -      Pain at Best 0  -      Pain at Worst 4  -         Services    Are you currently receiving Home Health services No  -      Do you plan to receive Home Health services in the near future No  -         Daily Activities    Primary Language English  -      How does patient learn best? Listening;Demonstration  -      Teaching needs identified Management of Condition;Other (comment)  wound and edema management  -      Barriers to learning None  -      Pt Participated in POC and Goals Yes  -            User Key  (r) = Recorded By, (t) = Taken By, (c) = Cosigned By    Initials Name Provider Type     Yuriy Paige, PT Physical Therapist                EVALUATION   PT Ortho     Row Name 02/10/23 1300       Subjective Comments    Subjective Comments Pt reports he thinks his neighbor told him he has arterial disease in his legs and also gave him compression garments which he thinks are way too tight.  -       Subjective Pain    Able to rate subjective pain? yes  -    Pre-Treatment Pain Level 0  -    Post-Treatment Pain Level 0  -       Transfers    Sit-Stand [place] (Transfers) independent  -    Stand-Sit [place] (Transfers) independent  -    Comment, (Transfers) Seated for tx  -       Gait/Stairs (Locomotion)    [place] Level (Gait)  "independent  -          User Key  (r) = Recorded By, (t) = Taken By, (c) = Cosigned By    Initials Name Provider Type     Yuriy Paige, PT Physical Therapist               LDA Wound     Row Name 02/10/23 1300             Wound 02/10/23 1300 Left lower leg Blisters    Wound - Properties Group Placement Date: 02/10/23  - Placement Time: 1300  -LH Present on Hospital Admission: Y  -LH Side: Left  -LH Orientation: lower  -LH Location: leg  -LH Primary Wound Type: Blisters  -LH    Wound Image Images linked: 1  -LH      Dressing Appearance open to air  -      Base dry;scab  -LH      Periwound intact;dry;pink;swelling;edematous  -      Periwound Temperature warm  -      Periwound Skin Turgor firm  -      Edges open  -      Wound Length (cm) 2.5 cm  -      Wound Width (cm) 2.2 cm  -      Wound Depth (cm) --  obscured  -      Wound Surface Area (cm^2) 5.5 cm^2  -      Drainage Characteristics/Odor serosanguineous  -      Drainage Amount scant  -      Care, Wound cleansed with;soap and water;debrided  -      Dressing Care dressing applied;petroleum-based;gauze;low-adherent;foam;border dressing  Xeroform, 4\" optifoam, MLW  -      Periwound Care cleansed with pH balanced cleanser;dry periwound area maintained  -      Retired Wound - Properties Group Placement Date: 02/10/23  - Placement Time: 1300  -LH Present on Hospital Admission: Y  -LH Side: Left  -LH Orientation: lower  -LH Location: leg  -LH Primary Wound Type: Blisters  -LH    Retired Wound - Properties Group Date first assessed: 02/10/23  - Time first assessed: 1300  -LH Present on Hospital Admission: Y  -LH Side: Left  -LH Location: leg  -LH Primary Wound Type: Blisters  -LH       Wound 02/10/23 1300 Right lower leg Blisters    Wound - Properties Group Placement Date: 02/10/23  - Placement Time: 1300  -LH Present on Hospital Admission: Y  -LH Side: Right  -LH Orientation: lower  -LH Location: leg  -LH Primary Wound Type: " "Blisters  -    Wound Image Images linked: 2  -LH      Dressing Appearance open to air  -      Base dry;scab  -      Periwound intact;dry;pink;edematous;swelling  -      Periwound Temperature warm  -      Periwound Skin Turgor firm  -      Edges open  -      Wound Length (cm) 2.5 cm  R anterior leg. R posterior leg = 3.0cm x 1.0cm  -      Wound Width (cm) 1.4 cm  -      Wound Depth (cm) --  obscured  -      Wound Surface Area (cm^2) 3.5 cm^2  -      Drainage Characteristics/Odor serosanguineous  -      Drainage Amount scant  -      Care, Wound cleansed with;soap and water;debrided  -      Dressing Care dressing applied;petroleum-based;gauze;low-adherent;foam;border dressing  Xeroform, 4\" optifoam, MLW  -LH      Periwound Care cleansed with pH balanced cleanser;dry periwound area maintained  -      Retired Wound - Properties Group Placement Date: 02/10/23  - Placement Time: 1300  -LH Present on Hospital Admission: Y  -LH Side: Right  -LH Orientation: lower  - Location: leg  - Primary Wound Type: Blisters  -    Retired Wound - Properties Group Date first assessed: 02/10/23  - Time first assessed: 1300  -LH Present on Hospital Admission: Y  -LH Side: Right  -LH Location: leg  -LH Primary Wound Type: Blisters  -LH          User Key  (r) = Recorded By, (t) = Taken By, (c) = Cosigned By    Initials Name Provider Type     Yuriy Paige, PT Physical Therapist               Lymphedema     Row Name 02/10/23 1300             Lymphedema Edema Assessment    Ptting Edema Category By severity  -      Pitting Edema Moderate  -         Skin Changes/Observations    Location/Assessment Lower Extremity  -      Lower Extremity Conditions bilateral:;dry;hairless;scaly;crust;scab(s);fragile  -      Lower Extremity Color/Pigment bilateral:;hypopigmented  -         Lymphedema Pulses/Capillary Refill    Lymphedema Pulses/Capillary Refill lower extremity pulses;capillary refill  Bilateral " DP and PT pulses doplerable  -      Dorsalis Pedis Pulse right:;left:;+2 normal  -      Posterior Tibialis Pulse right:;left:;+1 diminished  -      Capillary Refill lower extremity capillary refill  -      Lower Extremity Capillary Refill right:;left:;less than 3 seconds  -         Lymphedema Measurements    Measurement Type(s) Quick Girth  -      Quick Girth Areas Lower extremities  -         LLE Quick Girth (cm)    Mid foot 23.2 cm  -      Smallest ankle 21.3 cm  -LH      Largest calf 38.9 cm  -         RLE Quick Girth (cm)    Mid foot 24 cm  -LH      Smallest ankle 22.8 cm  -LH      Largest calf 39 cm  -LH      RLE Quick Girth Total 85.8  -         Compression/Skin Care    Compression/Skin Care skin care;wrapping location  -      Skin Care washed/dried;lotion applied  -      Wrapping Location lower extremity  -      Wrapping Location LE bilateral:;foot to knee  -      Wrapping Comments BLE wound dressings, size 4/5 compressogrips applied doubled and overlapping for gradient compression.  -            User Key  (r) = Recorded By, (t) = Taken By, (c) = Cosigned By    Initials Name Provider Type     Yuriy Paige, PT Physical Therapist                WOUND DEBRIDEMENT  Total area of Debridement: 4cm2  Debridement Site 1  Location- Site 1: BLE  Selective Debridement- Site 1: Wound Surface <20cmsq  Instruments- Site 1: tweezers  Excised Tissue Description- Site 1: minimum, slough, other (comment) (scabbing, dry, flaking nonviable tissue)  Bleeding- Site 1: none              Therapy Education     Row Name 02/10/23 1400             Therapy Education    Education Details Keep dressings dry and intact with wounds covered at all times. May keep dressings on for 2-3 days. Cleanse areas with theraworx and 4x4 gauze. Cover scabs with xeroform and optifoams. May leave MLW in place as much as tolerable. Check for areas of redness or cold toes and increased pain. Discussed differences between  venous and arterial diseases.  -      Given Symptoms/condition management;Edema management;Bandaging/dressing change  -      Program New  -      How Provided Verbal;Demonstration  -      Provided to Patient  -      Level of Understanding Verbalized  -            User Key  (r) = Recorded By, (t) = Taken By, (c) = Cosigned By    Initials Name Provider Type     Yuriy Paige, PT Physical Therapist                Recommendation and Plan   PT Assessment/Plan     Row Name 02/10/23 1300          PT Assessment    Functional Limitations Other (comment)  wound management  -     Impairments Integumentary integrity;Edema;Impaired venous circulation  -     Assessment Comments PT wound care initial evaluation complete. Pt presents with moderate BLE edema which pt reports he has had a history of although recently d/t change in medication had severe increase in edema causing blisters. Pt with BLE wounds fully covered in thick, dry scabbing along with bilateral calf skin changes consistent with venous insufficiency. Pt with diagnosis of PAD per chart, although pt with strong palpable and dopplerable bilateral DP and PF pulses. PT applied xeroform to scabbed areas and covered with optifoam along with BLE compressogrips for light compression to help improve venous return, reduce limb girth, and improve skin integrity. Pt would continue to benefit from further debrdiement and MLW to promote wound healing.  -     Rehab Potential Fair  -     Patient/caregiver participated in establishment of treatment plan and goals Yes  -     Patient would benefit from skilled therapy intervention Yes  -        PT Plan    PT Frequency 1x/week  -     Predicted Duration of Therapy Intervention (PT) 8 visits  -     Planned CPT's? PT EVAL MOD COMPLELITY: 31611;PT SELF CARE/MGMT/TRAIN 15 MIN: 99528;PT NONSELECT DEBRIDE 15 MIN: 48321;PT NIXON DEBRIDE OPEN WOUND UP TO 20 CM: 13901;PT NIXON DEBRIDE OPEN WOUND EA ADD 20 CM: 61260;PT  UNNA BOOT: 74404;PT MULTI LAYER COMP SYS LE  -     Physical Therapy Interventions (Optional Details) wound care;patient/family education  -     PT Plan Comments Debride, dressings, MLW  -           User Key  (r) = Recorded By, (t) = Taken By, (c) = Cosigned By    Initials Name Provider Type     Yuriy Paige, PT Physical Therapist                  Goals   PT OP Goals     Row Name 02/10/23 1407 02/10/23 1400       PT Short Term Goals    STG 1 -- Verbalize signs/symptoms of infection and when to seek medical attention.  -    STG 1 Progress -- New  -    STG 2 -- Decrease area of wounds by 50% as evidence of wound healing.  -    STG 2 Progress -- New  -    STG 3 -- Demonstrate minimal remaining BLE edema to improve skin integrity.  -    STG 3 Progress -- New  -       Long Term Goals    LTG 1 -- Demonstrate no remaining open wounds to allow for transition to home management.  -    LTG 1 Progress -- New  -    LTG 2 -- Demonstrate no remaining BLE edema to transition to home management.  -    LTG 2 Progress -- New  -       Time Calculation    PT Goal Re-Cert Due Date 23  - 23  -          User Key  (r) = Recorded By, (t) = Taken By, (c) = Cosigned By    Initials Name Provider Type     Yuriy Paige, PT Physical Therapist                Time Calculation: Start Time: 1300  Untimed Charges  PT Eval/Re-eval Minutes: 40  Wound Care: 34581 Selective debridement, 46625 Multilayer comp below knee  66743-Cisjpwrvxt comp below knee: 15  38162-Cnvlnpmbk debridement: 10  Total Minutes  Untimed Charges Total Minutes: 65   Total Minutes: 65  Therapy Charges for Today     Code Description Service Date Service Provider Modifiers Qty    64366287659 HC PT EVAL MOD COMPLEXITY 3 2/10/2023 Yuriy Paige, PT GP 1    05414534678 HC NIXON DEBRIDE OPEN WOUND UP TO 20CM 2/10/2023 Yuriy Paige, PT GP 1    09287228151 HC PT MULTI LAYER COMP SYS BELOW KNEE 2/10/2023 Yuriy Paige, PT  GP 1                Yuriy Paige, PT  2/10/2023

## 2023-02-14 ENCOUNTER — TRANSCRIBE ORDERS (OUTPATIENT)
Dept: LAB | Facility: HOSPITAL | Age: 68
End: 2023-02-14
Payer: MEDICARE

## 2023-02-14 ENCOUNTER — LAB (OUTPATIENT)
Dept: LAB | Facility: HOSPITAL | Age: 68
End: 2023-02-14
Payer: MEDICARE

## 2023-02-14 DIAGNOSIS — N28.9 URETERAL SLUDGE: ICD-10-CM

## 2023-02-14 DIAGNOSIS — N28.9 URETERAL SLUDGE: Primary | ICD-10-CM

## 2023-02-14 LAB
ALBUMIN SERPL-MCNC: 4.1 G/DL (ref 3.5–5.2)
ALBUMIN/GLOB SERPL: 1.4 G/DL
ALP SERPL-CCNC: 109 U/L (ref 39–117)
ALT SERPL W P-5'-P-CCNC: 13 U/L (ref 1–41)
ANION GAP SERPL CALCULATED.3IONS-SCNC: 9.5 MMOL/L (ref 5–15)
AST SERPL-CCNC: 15 U/L (ref 1–40)
BASOPHILS # BLD AUTO: 0.07 10*3/MM3 (ref 0–0.2)
BASOPHILS NFR BLD AUTO: 1.1 % (ref 0–1.5)
BILIRUB SERPL-MCNC: 0.5 MG/DL (ref 0–1.2)
BILIRUB UR QL STRIP: NEGATIVE
BUN SERPL-MCNC: 28 MG/DL (ref 8–23)
BUN/CREAT SERPL: 12.4 (ref 7–25)
CALCIUM SPEC-SCNC: 10 MG/DL (ref 8.6–10.5)
CHLORIDE SERPL-SCNC: 102 MMOL/L (ref 98–107)
CLARITY UR: CLEAR
CO2 SERPL-SCNC: 25.5 MMOL/L (ref 22–29)
COLOR UR: YELLOW
CREAT SERPL-MCNC: 2.25 MG/DL (ref 0.76–1.27)
DEPRECATED RDW RBC AUTO: 39.5 FL (ref 37–54)
EGFRCR SERPLBLD CKD-EPI 2021: 31 ML/MIN/1.73
EOSINOPHIL # BLD AUTO: 0.33 10*3/MM3 (ref 0–0.4)
EOSINOPHIL NFR BLD AUTO: 5.2 % (ref 0.3–6.2)
ERYTHROCYTE [DISTWIDTH] IN BLOOD BY AUTOMATED COUNT: 12.3 % (ref 12.3–15.4)
GLOBULIN UR ELPH-MCNC: 3 GM/DL
GLUCOSE SERPL-MCNC: 140 MG/DL (ref 65–99)
GLUCOSE UR STRIP-MCNC: ABNORMAL MG/DL
HBA1C MFR BLD: 7.1 % (ref 4.8–5.6)
HCT VFR BLD AUTO: 32.9 % (ref 37.5–51)
HGB BLD-MCNC: 11.5 G/DL (ref 13–17.7)
HGB UR QL STRIP.AUTO: NEGATIVE
IMM GRANULOCYTES # BLD AUTO: 0.02 10*3/MM3 (ref 0–0.05)
IMM GRANULOCYTES NFR BLD AUTO: 0.3 % (ref 0–0.5)
KETONES UR QL STRIP: NEGATIVE
LEUKOCYTE ESTERASE UR QL STRIP.AUTO: NEGATIVE
LYMPHOCYTES # BLD AUTO: 1.35 10*3/MM3 (ref 0.7–3.1)
LYMPHOCYTES NFR BLD AUTO: 21.1 % (ref 19.6–45.3)
MCH RBC QN AUTO: 30.8 PG (ref 26.6–33)
MCHC RBC AUTO-ENTMCNC: 35 G/DL (ref 31.5–35.7)
MCV RBC AUTO: 88.2 FL (ref 79–97)
MONOCYTES # BLD AUTO: 0.78 10*3/MM3 (ref 0.1–0.9)
MONOCYTES NFR BLD AUTO: 12.2 % (ref 5–12)
NEUTROPHILS NFR BLD AUTO: 3.84 10*3/MM3 (ref 1.7–7)
NEUTROPHILS NFR BLD AUTO: 60.1 % (ref 42.7–76)
NITRITE UR QL STRIP: NEGATIVE
NRBC BLD AUTO-RTO: 0 /100 WBC (ref 0–0.2)
PH UR STRIP.AUTO: 7 [PH] (ref 5–8)
PHOSPHATE SERPL-MCNC: 4.9 MG/DL (ref 2.5–4.5)
PLATELET # BLD AUTO: 169 10*3/MM3 (ref 140–450)
PMV BLD AUTO: 10.8 FL (ref 6–12)
POTASSIUM SERPL-SCNC: 5.2 MMOL/L (ref 3.5–5.2)
PROT SERPL-MCNC: 7.1 G/DL (ref 6–8.5)
PROT UR QL STRIP: ABNORMAL
PSA SERPL-MCNC: 0.44 NG/ML (ref 0–4)
RBC # BLD AUTO: 3.73 10*6/MM3 (ref 4.14–5.8)
SODIUM SERPL-SCNC: 137 MMOL/L (ref 136–145)
SP GR UR STRIP: 1.01 (ref 1–1.03)
UROBILINOGEN UR QL STRIP: ABNORMAL
WBC NRBC COR # BLD: 6.39 10*3/MM3 (ref 3.4–10.8)

## 2023-02-14 PROCEDURE — 82570 ASSAY OF URINE CREATININE: CPT

## 2023-02-14 PROCEDURE — G0103 PSA SCREENING: HCPCS | Performed by: STUDENT IN AN ORGANIZED HEALTH CARE EDUCATION/TRAINING PROGRAM

## 2023-02-14 PROCEDURE — 85025 COMPLETE CBC W/AUTO DIFF WBC: CPT

## 2023-02-14 PROCEDURE — 80053 COMPREHEN METABOLIC PANEL: CPT | Performed by: STUDENT IN AN ORGANIZED HEALTH CARE EDUCATION/TRAINING PROGRAM

## 2023-02-14 PROCEDURE — 84156 ASSAY OF PROTEIN URINE: CPT

## 2023-02-14 PROCEDURE — 81001 URINALYSIS AUTO W/SCOPE: CPT

## 2023-02-14 PROCEDURE — 36415 COLL VENOUS BLD VENIPUNCTURE: CPT

## 2023-02-14 PROCEDURE — 83036 HEMOGLOBIN GLYCOSYLATED A1C: CPT | Performed by: STUDENT IN AN ORGANIZED HEALTH CARE EDUCATION/TRAINING PROGRAM

## 2023-02-14 PROCEDURE — 84100 ASSAY OF PHOSPHORUS: CPT

## 2023-02-15 ENCOUNTER — HOSPITAL ENCOUNTER (OUTPATIENT)
Dept: PHYSICAL THERAPY | Facility: HOSPITAL | Age: 68
Setting detail: THERAPIES SERIES
Discharge: HOME | End: 2023-02-15
Payer: MEDICARE

## 2023-02-15 DIAGNOSIS — S81.802D MULTIPLE OPEN WOUNDS OF LOWER LEG, LEFT, SUBSEQUENT ENCOUNTER: ICD-10-CM

## 2023-02-15 DIAGNOSIS — R60.0 BILATERAL EDEMA OF LOWER EXTREMITY: ICD-10-CM

## 2023-02-15 DIAGNOSIS — S81.801D MULTIPLE OPEN WOUNDS OF LOWER LEG, RIGHT, SUBSEQUENT ENCOUNTER: Primary | ICD-10-CM

## 2023-02-15 DIAGNOSIS — I87.2 VENOUS INSUFFICIENCY OF BOTH LOWER EXTREMITIES: ICD-10-CM

## 2023-02-15 LAB
BACTERIA UR QL AUTO: NORMAL /HPF
CREAT UR-MCNC: 21.1 MG/DL
HYALINE CASTS UR QL AUTO: NORMAL /LPF
PROT ?TM UR-MCNC: 85.7 MG/DL
RBC # UR STRIP: NORMAL /HPF
REF LAB TEST METHOD: NORMAL
SQUAMOUS #/AREA URNS HPF: NORMAL /HPF
WBC # UR STRIP: NORMAL /HPF

## 2023-02-15 PROCEDURE — 97597 DBRDMT OPN WND 1ST 20 CM/<: CPT

## 2023-02-15 PROCEDURE — 29581 APPL MULTLAYER CMPRN SYS LEG: CPT

## 2023-02-15 NOTE — THERAPY WOUND CARE TREATMENT
Outpatient Rehabilitation - Wound/Debridement Treatment Note  [facility]     Patient Name: Justice Landeros  : 1955  MRN: [id number]  Today's Date: 2/15/2023                 Admit Date: 2/15/2023    Visit Dx:    ICD-10-CM ICD-9-CM   1. Multiple open wounds of lower leg, right, subsequent encounter  S81.801D V58.89     894.0   2. Multiple open wounds of lower leg, left, subsequent encounter  S81.802D V58.89     894.0   3. Bilateral edema of lower extremity  R60.0 782.3   4. Venous insufficiency of both lower extremities  I87.2 459.81       Patient Active Problem List   Diagnosis   • CAD (coronary artery disease)   • Controlled type 2 diabetes mellitus with chronic kidney disease, with long-term current use of insulin ([facility])   • Stage 3b chronic kidney disease ([facility])   • Chronic low back pain   • Chronic neck pain   • Degeneration of lumbar intervertebral disc   • ED (erectile dysfunction)   • Encounter for long-term (current) use of insulin ([facility])   • Essential hypertension   • Gastroesophageal reflux disease   • HLD (hyperlipidemia)   • Mixed hyperlipidemia   • Hypothyroidism   • Iron deficiency anemia   • Kidney stone   • Overweight with body mass index (BMI) 25.0-29.9   • Postsurgical aortocoronary bypass status   • Pulmonary hypertension ([facility])   • Vitamin D deficiency   • PAD (peripheral artery disease) ([facility])   • Other proteinuria        Past Medical History:   Diagnosis Date   • Diabetes mellitus ([facility])    • GERD (gastroesophageal reflux disease)    • Hyperlipidemia    • Hypertension         Past Surgical History:   Procedure Laterality Date   • CORONARY ARTERY BYPASS GRAFT      . [facility]         EVALUATION   PT Ortho     Row Name 02/15/23 0900       Subjective Comments    Subjective Comments Pt reports he is very pleased with the reduced swelling in his legs. Reports he has been wearing his compression although did not put them on this morning d/t coming to wound care. Reports he  "has an appointment with a vascular specialist on Friday.  -LH       Subjective Pain    Able to rate subjective pain? yes  -LH    Pre-Treatment Pain Level 0  -LH    Post-Treatment Pain Level 0  -LH       Transfers    Sit-Stand [place] (Transfers) independent  -LH    Stand-Sit [place] (Transfers) independent  -LH    Comment, (Transfers) Seated for tx  -LH       Gait/Stairs (Locomotion)    [place] Level (Gait) independent  -          User Key  (r) = Recorded By, (t) = Taken By, (c) = Cosigned By    Initials Name Provider Type     Yuriy Paige, PT Physical Therapist                 LDA Wound     Row Name 02/15/23 0900             Wound 02/10/23 1300 Left lower leg Blisters    Wound - Properties Group Placement Date: 02/10/23  - Placement Time: 1300  -LH Present on Hospital Admission: Y  -LH Side: Left  -LH Orientation: lower  -LH Location: leg  -LH Primary Wound Type: Blisters  -LH    Dressing Appearance intact;moist drainage  -      Base moist;pink;red;epithelialization  -      Periwound intact;dry;pink;swelling  -      Periwound Temperature warm  -      Periwound Skin Turgor soft  -      Edges open  -      Drainage Characteristics/Odor serosanguineous  -      Drainage Amount scant  -LH      Care, Wound cleansed with;soap and water;debrided  -LH      Dressing Care dressing changed;silver impregnated;low-adherent;foam;silicone;border dressing;multi-layer wrap  Mepilex Ag, 4\" optifoam, MLW  -LH      Periwound Care cleansed with pH balanced cleanser;dry periwound area maintained  -      Retired Wound - Properties Group Placement Date: 02/10/23  - Placement Time: 1300  -LH Present on Hospital Admission: Y  -LH Side: Left  -LH Orientation: lower  -LH Location: leg  -LH Primary Wound Type: Blisters  -LH    Retired Wound - Properties Group Date first assessed: 02/10/23  - Time first assessed: 1300  -LH Present on Hospital Admission: Y  -LH Side: Left  -LH Location: leg  -LH Primary " "Wound Type: Blisters  -LH       Wound 02/10/23 1300 Right lower leg Blisters    Wound - Properties Group Placement Date: 02/10/23  - Placement Time: 1300  -LH Present on Hospital Admission: Y  -LH Side: Right  -LH Orientation: lower  -LH Location: leg  -LH Primary Wound Type: Blisters  -LH    Dressing Appearance intact;moist drainage  -LH      Base moist;pink;red;epithelialization  Pinpoint open areas remaining  -      Periwound intact;dry;pink;swelling  -LH      Periwound Temperature warm  -      Periwound Skin Turgor soft  -LH      Edges open  -LH      Drainage Characteristics/Odor serosanguineous  -      Drainage Amount scant  -LH      Care, Wound cleansed with;soap and water;debrided  -LH      Dressing Care dressing changed;silver impregnated;low-adherent;foam;silicone;border dressing  Mepilex Ag, 4\" optifoam, MLW  -LH      Periwound Care cleansed with pH balanced cleanser;dry periwound area maintained  -      Retired Wound - Properties Group Placement Date: 02/10/23  - Placement Time: 1300  -LH Present on Hospital Admission: Y  -LH Side: Right  -LH Orientation: lower  -LH Location: leg  -LH Primary Wound Type: Blisters  -LH    Retired Wound - Properties Group Date first assessed: 02/10/23  - Time first assessed: 1300  -LH Present on Hospital Admission: Y  -LH Side: Right  -LH Location: leg  -LH Primary Wound Type: Blisters  -LH          User Key  (r) = Recorded By, (t) = Taken By, (c) = Cosigned By    Initials Name Provider Type     Yuriy Paige, PT Physical Therapist               Lymphedema     Row Name 02/15/23 0900             Lymphedema Edema Assessment    Ptting Edema Category By severity  -      Pitting Edema Mild;Other (comment)  L>R  -         Skin Changes/Observations    Location/Assessment Lower Extremity  -      Lower Extremity Conditions bilateral:;dry;hairless;scaly;fragile  -      Lower Extremity Color/Pigment bilateral:;hypopigmented  -         Lymphedema " Pulses/Capillary Refill    Capillary Refill lower extremity capillary refill  -      Lower Extremity Capillary Refill right:;left:;less than 3 seconds  -         Lymphedema Measurements    Measurement Type(s) Quick Girth  -      Quick Girth Areas Lower extremities  -         LLE Quick Girth (cm)    Mid foot 23.5 cm  -      Smallest ankle 20.8 cm  -      Largest calf 37.8 cm  -         RLE Quick Girth (cm)    Mid foot 22.9 cm  -      Smallest ankle 22.1 cm  -      Largest calf 26.8 cm  -      RLE Quick Girth Total 71.8  -         Compression/Skin Care    Compression/Skin Care skin care;wrapping location  -      Skin Care washed/dried;lotion applied  -      Wrapping Location lower extremity  -      Wrapping Location LE bilateral:;foot to knee  -      Wrapping Comments BLE wound dressings, size 4/5 compressogrips applied doubled and overlapping for gradient compression.  -            User Key  (r) = Recorded By, (t) = Taken By, (c) = Cosigned By    Initials Name Provider Type     Yuriy Paige, PT Physical Therapist                WOUND DEBRIDEMENT  Total area of Debridement: 2cm2  Debridement Site 1  Location- Site 1: BLE  Selective Debridement- Site 1: Wound Surface <20cmsq  Instruments- Site 1: tweezers  Excised Tissue Description- Site 1: minimum, slough, other (comment) (periwound crusts, macerated skin debris)  Bleeding- Site 1: none              Therapy Education     Row Name 02/15/23 0900             Therapy Education    Education Details Discontinue use of xeroform and instead use Mepilex Ag as contact layer. Keep wounds covered at all times. Beging shopping for BLE compression garments with given measurements for long term home edema management and bring to next session. Likely will only need 1-2 additional wound care treatmentss.  -      Given Symptoms/condition management;Edema management;Bandaging/dressing change  -      Program Reinforced;Progressed;Modified  -       How Provided Verbal;Demonstration  -      Provided to Patient  -      Level of Understanding Verbalized  -            User Key  (r) = Recorded By, (t) = Taken By, (c) = Cosigned By    Initials Name Provider Type     Yuriy Paige, PT Physical Therapist                Recommendation and Plan   PT Assessment/Plan     Row Name 02/15/23 0900          PT Assessment    Functional Limitations Other (comment)  wound management  -     Impairments Integumentary integrity;Edema;Impaired venous circulation  -     Assessment Comments Pt demonstrating excellent improvements in BLE wounds and edema management as pt with no scabbing and only pinpoint open areas remaining. Pt with excellent re-epithelialization of wound edges, PT ancitipating full closure of all wounds in approximately 1 more week. PT educated pt on importance of long term compression garments for edema management and issued pt with BLE measurements to help with obtaining personal compression garments. Pt would continue to benefit from current POC to promote wound healing.  -     Rehab Potential Fair  -     Patient/caregiver participated in establishment of treatment plan and goals Yes  -     Patient would benefit from skilled therapy intervention Yes  -        PT Plan    PT Frequency 1x/week  -     Physical Therapy Interventions (Optional Details) wound care;patient/family education  -     PT Plan Comments Debride, dressings, MLW  -           User Key  (r) = Recorded By, (t) = Taken By, (c) = Cosigned By    Initials Name Provider Type     Yuriy Paige, PT Physical Therapist                Goals   PT OP Goals     Row Name 02/15/23 0957          Time Calculation    PT Goal Re-Cert Due Date 05/11/23  -           User Key  (r) = Recorded By, (t) = Taken By, (c) = Cosigned By    Initials Name Provider Type     Yuriy Paige, PT Physical Therapist                PT Goal Re-Cert Due Date: 05/11/23            Time Calculation:  Start Time: 0845  Untimed Charges  82878-Muiwxuspum comp below knee: 15  12430-Nsgqqipux debridement: 10  Total Minutes  Untimed Charges Total Minutes: 25   Total Minutes: 25  Therapy Charges for Today     Code Description Service Date Service Provider Modifiers Qty    21537105270 HC NIXON DEBRIDE OPEN WOUND UP TO 20CM 2/15/2023 Yuriy Paige, PT GP 1    89772863776 HC PT MULTI LAYER COMP SYS BELOW KNEE 2/15/2023 Yuriy Paige, PT GP 1                  Yuriy Paige, PT  2/15/2023

## 2023-02-17 DIAGNOSIS — E11.22 CONTROLLED TYPE 2 DIABETES MELLITUS WITH CHRONIC KIDNEY DISEASE, WITH LONG-TERM CURRENT USE OF INSULIN, UNSPECIFIED CKD STAGE: ICD-10-CM

## 2023-02-17 DIAGNOSIS — Z79.4 CONTROLLED TYPE 2 DIABETES MELLITUS WITH CHRONIC KIDNEY DISEASE, WITH LONG-TERM CURRENT USE OF INSULIN, UNSPECIFIED CKD STAGE: ICD-10-CM

## 2023-02-17 RX ORDER — LANCETS 33 GAUGE
1 EACH MISCELLANEOUS
Qty: 300 EACH | Refills: 3 | Status: SHIPPED | OUTPATIENT
Start: 2023-02-17

## 2023-02-17 NOTE — TELEPHONE ENCOUNTER
Rx Refill Note  Requested Prescriptions     Pending Prescriptions Disp Refills   • Lancets (OneTouch Delica Plus [id number]) misc 300 each 3      Last office visit with prescribing clinician: 2/7/2023   Last telemedicine visit with prescribing clinician: 3/7/2023   Next office visit with prescribing clinician: 3/7/2023                         Would you like a call back once the refill request has been completed: [] Yes [] No    If the office needs to give you a call back, can they leave a voicemail: [] Yes [] No    Summer Choudhury MA  02/17/23, 10:15 EST

## 2023-02-27 DIAGNOSIS — I73.9 PAD (PERIPHERAL ARTERY DISEASE): ICD-10-CM

## 2023-02-27 DIAGNOSIS — M79.89 LEG SWELLING: ICD-10-CM

## 2023-02-27 RX ORDER — FUROSEMIDE 20 MG/1
40 TABLET ORAL DAILY
Qty: 180 TABLET | Refills: 3
Start: 2023-02-27 | End: 2023-03-07

## 2023-02-27 NOTE — TELEPHONE ENCOUNTER
Caller: Justice Landeros    Relationship: Self    Best call back number: [phone]    Requested Prescriptions:   Requested Prescriptions     Pending Prescriptions Disp Refills   • furosemide (LASIX) 20 MG tablet 180 tablet 3     Sig: Take 2 tablets by mouth Daily.    PEN NEEDLES FOR INSULIN    Pharmacy where request should be sent: [facility] - [facility] [address] [facility] [phone] [facility] [phone] FX     Additional details provided by patient: PATIENT IS TAKING THE FUROSEMIDE ONE IN THE MORNING AND ONE IN THE EVENING PER DOCTOR.     [facility] WILL NOT TAKE OFF DR. TED CALIX FROM THESE PRESCRIPTIONS. PATIENT IS NEEDING HIS MEDICATION SENT TO [facility] UNDER DR. JIMÉNEZ NAME. IF [facility] KEEPS GIVING HIM ISSUES PLEASE CALL ALL MEDICATION OVER TO GET THESE SWITCHED OVER SINCE PATIENT SWITCHED PCP.  PATIENT IS HAVING NO LUCK WITH [facility] AND IS NEEDING HELP FROM THE DOCTOR.     Does the patient have less than a 3 day supply:  [x] Yes  [] No    Would you like a call back once the refill request has been completed: [x] Yes [] No    If the office needs to give you a call back, can they leave a voicemail: [x] Yes [] No    Jocelyn Kelly Rep   02/27/23 09:37 EST

## 2023-02-28 DIAGNOSIS — Z79.4 CONTROLLED TYPE 2 DIABETES MELLITUS WITH STAGE 3 CHRONIC KIDNEY DISEASE, WITH LONG-TERM CURRENT USE OF INSULIN: ICD-10-CM

## 2023-02-28 DIAGNOSIS — N18.32 STAGE 3B CHRONIC KIDNEY DISEASE: ICD-10-CM

## 2023-02-28 DIAGNOSIS — N18.30 CONTROLLED TYPE 2 DIABETES MELLITUS WITH STAGE 3 CHRONIC KIDNEY DISEASE, WITH LONG-TERM CURRENT USE OF INSULIN: ICD-10-CM

## 2023-02-28 DIAGNOSIS — M79.89 LEG SWELLING: ICD-10-CM

## 2023-02-28 DIAGNOSIS — I73.9 PAD (PERIPHERAL ARTERY DISEASE): ICD-10-CM

## 2023-02-28 DIAGNOSIS — R80.9 PROTEINURIA, UNSPECIFIED TYPE: ICD-10-CM

## 2023-02-28 DIAGNOSIS — E11.22 CONTROLLED TYPE 2 DIABETES MELLITUS WITH STAGE 3 CHRONIC KIDNEY DISEASE, WITH LONG-TERM CURRENT USE OF INSULIN: ICD-10-CM

## 2023-03-01 ENCOUNTER — HOSPITAL ENCOUNTER (OUTPATIENT)
Dept: PHYSICAL THERAPY | Facility: HOSPITAL | Age: 68
Setting detail: THERAPIES SERIES
Discharge: HOME | End: 2023-03-01
Payer: MEDICARE

## 2023-03-01 DIAGNOSIS — S81.802D MULTIPLE OPEN WOUNDS OF LOWER LEG, LEFT, SUBSEQUENT ENCOUNTER: ICD-10-CM

## 2023-03-01 DIAGNOSIS — I87.2 VENOUS INSUFFICIENCY OF BOTH LOWER EXTREMITIES: ICD-10-CM

## 2023-03-01 DIAGNOSIS — R60.0 BILATERAL EDEMA OF LOWER EXTREMITY: ICD-10-CM

## 2023-03-01 DIAGNOSIS — S81.801D MULTIPLE OPEN WOUNDS OF LOWER LEG, RIGHT, SUBSEQUENT ENCOUNTER: Primary | ICD-10-CM

## 2023-03-01 PROCEDURE — 29581 APPL MULTLAYER CMPRN SYS LEG: CPT

## 2023-03-01 NOTE — THERAPY PROGRESS REPORT/RE-CERT
Outpatient Rehabilitation - Wound/Debridement Progress Note  [facility]     Patient Name: Justice Landeros  : 1955  MRN: [id number]  Today's Date: 3/1/2023                 Admit Date: 3/1/2023    Visit Dx:    ICD-10-CM ICD-9-CM   1. Multiple open wounds of lower leg, right, subsequent encounter  S81.801D V58.89     894.0   2. Multiple open wounds of lower leg, left, subsequent encounter  S81.802D V58.89     894.0   3. Bilateral edema of lower extremity  R60.0 782.3   4. Venous insufficiency of both lower extremities  I87.2 459.81       Patient Active Problem List   Diagnosis   • CAD (coronary artery disease)   • Controlled type 2 diabetes mellitus with chronic kidney disease, with long-term current use of insulin ([facility])   • Stage 3b chronic kidney disease (HCC)   • Chronic low back pain   • Chronic neck pain   • Degeneration of lumbar intervertebral disc   • ED (erectile dysfunction)   • Encounter for long-term (current) use of insulin ([facility])   • Essential hypertension   • Gastroesophageal reflux disease   • HLD (hyperlipidemia)   • Mixed hyperlipidemia   • Hypothyroidism   • Iron deficiency anemia   • Kidney stone   • Overweight with body mass index (BMI) 25.0-29.9   • Postsurgical aortocoronary bypass status   • Pulmonary hypertension (HCC)   • Vitamin D deficiency   • PAD (peripheral artery disease) ([facility])   • Other proteinuria        Past Medical History:   Diagnosis Date   • Diabetes mellitus (HCC)    • GERD (gastroesophageal reflux disease)    • Hyperlipidemia    • Hypertension         Past Surgical History:   Procedure Laterality Date   • CORONARY ARTERY BYPASS GRAFT      . [facility]         EVALUATION   PT Ortho     Row Name 23 0845       Subjective Comments    Subjective Comments Pt states his leg swelling has improved significantly after his doctor changed one of his medications.  Reports he has been using the compressogrips, but didn't put them on this AM due to coming  for tx.  States his legs feel much better when he is using compression.  -       Subjective Pain    Able to rate subjective pain? yes  -    Pre-Treatment Pain Level 0  -JM    Post-Treatment Pain Level 0  -JM       Transfers    Sit-Stand Dawes (Transfers) independent  -JM    Stand-Sit Dawes (Transfers) independent  -    Comment, (Transfers) seated for tx  -JM       Gait/Stairs (Locomotion)    Dawes Level (Gait) independent  -          User Key  (r) = Recorded By, (t) = Taken By, (c) = Cosigned By    Initials Name Provider Type    Chen Stanley, PT Physical Therapist                 LDA Wound     Row Name 03/01/23 0845             Wound 02/10/23 1300 Left lower leg Blisters    Wound - Properties Group Placement Date: 02/10/23  - Placement Time: 1300  - Present on Hospital Admission: Y  -LH Side: Left  -LH Orientation: lower  -LH Location: leg  -LH Primary Wound Type: Blisters  -    Wound Image Images linked: 1  -      Dressing Appearance open to air;no drainage  -      Base moist;pink;red;epithelialization  2 very small partial-thickness areas, clean and red  -      Periwound intact;dry;pink;swelling  -      Periwound Temperature warm  -      Periwound Skin Turgor soft  -      Edges open  -      Drainage Characteristics/Odor serosanguineous  -      Drainage Amount scant  -      Care, Wound cleansed with;wound cleanser  -      Dressing Care skin barrier agent applied;multi-layer wrap  -      Periwound Care barrier ointment applied;cleansed with pH balanced cleanser;dry periwound area maintained  zguard  -      Retired Wound - Properties Group Placement Date: 02/10/23  - Placement Time: 1300  -LH Present on Hospital Admission: Y  -LH Side: Left  -LH Orientation: lower  -LH Location: leg  -LH Primary Wound Type: Blisters  -LH    Retired Wound - Properties Group Date first assessed: 02/10/23  - Time first assessed: 1300  - Present on Hospital  Admission: Y  -LH Side: Left  -LH Location: leg  -LH Primary Wound Type: Blisters  -LH       Wound 02/10/23 1300 Right lower leg Blisters    Wound - Properties Group Placement Date: 02/10/23  - Placement Time: 1300  -LH Present on Hospital Admission: Y  -LH Side: Right  -LH Orientation: lower  -LH Location: leg  -LH Primary Wound Type: Blisters  -LH    Wound Image Images linked: 1  -      Dressing Appearance open to air;no drainage  -      Base moist;pink;red;epithelialization  Pinpoint open areas remaining  -      Periwound intact;dry;pink;swelling  -      Periwound Temperature warm  -      Periwound Skin Turgor soft  -      Edges open  -      Drainage Characteristics/Odor serosanguineous  -      Drainage Amount scant  -      Care, Wound cleansed with;wound cleanser  -      Dressing Care skin barrier agent applied;multi-layer wrap  -      Periwound Care barrier ointment applied;cleansed with pH balanced cleanser;dry periwound area maintained  zguard  -      Retired Wound - Properties Group Placement Date: 02/10/23  - Placement Time: 1300  -LH Present on Hospital Admission: Y  -LH Side: Right  -LH Orientation: lower  -LH Location: leg  -LH Primary Wound Type: Blisters  -LH    Retired Wound - Properties Group Date first assessed: 02/10/23  - Time first assessed: 1300  -LH Present on Hospital Admission: Y  -LH Side: Right  -LH Location: leg  -LH Primary Wound Type: Blisters  -LH          User Key  (r) = Recorded By, (t) = Taken By, (c) = Cosigned By    Initials Name Provider Type    Chen Stanley, PT Physical Therapist     Yuriy Paige, PT Physical Therapist               Lymphedema     Row Name 03/01/23 0845             Lymphedema Edema Assessment    Ptting Edema Category By severity  -      Pitting Edema Mild;Other (comment)  L>R  -         Skin Changes/Observations    Location/Assessment Lower Extremity  -      Lower Extremity Conditions  bilateral:;dry;hairless;scaly;fragile  -      Lower Extremity Color/Pigment bilateral:;hypopigmented  -         Lymphedema Pulses/Capillary Refill    Capillary Refill lower extremity capillary refill  -      Lower Extremity Capillary Refill right:;left:;less than 3 seconds  -         Lymphedema Measurements    Measurement Type(s) Quick Girth  -      Quick Girth Areas Lower extremities  -         LLE Quick Girth (cm)    Met-heads 24.5 cm  -      Mid foot 23.8 cm  -JM      Smallest ankle 23.5 cm  -JM      Largest calf 36.7 cm  -JM      Tib tuberosity 34 cm  -JM         RLE Quick Girth (cm)    Met-heads 24 cm  -JM      Mid foot 23.7 cm  -JM      Smallest ankle 21.4 cm  -JM      Largest calf 36 cm  -JM      Tib tuberosity 34 cm  -JM      RLE Quick Girth Total 139.1  -         Compression/Skin Care    Compression/Skin Care skin care;wrapping location  -      Skin Care washed/dried;lotion applied  -      Wrapping Location lower extremity  -      Wrapping Location LE bilateral:;foot to knee  -      Wrapping Comments size 4/5 compressogrips doubled and overlapping layers for gradient compression  -            User Key  (r) = Recorded By, (t) = Taken By, (c) = Cosigned By    Initials Name Provider Type    Chen Stanley, PT Physical Therapist                   Therapy Education     Row Name 03/01/23 0845             Therapy Education    Education Details OK to only use zguard on shallow areas if not draining, otherwise, use bandage to cover wound prior to placing compression.  Discussed options for long-term compression, either use of size 4 compressogrips or pt can purchase OTC compression stockings.  Pt has been given his leg measurements to be able to purchase correct size.  Otherwise, plan for tentative d/c today with pt to call for appt PRN until end of current certification at beginning of May.  -SKYE      Given Symptoms/condition management;Edema management;Bandaging/dressing change  -SKYE       Program Reinforced;Progressed;Modified  -      How Provided Verbal;Demonstration  -      Provided to Patient  -      Level of Understanding Verbalized  -            User Key  (r) = Recorded By, (t) = Taken By, (c) = Cosigned By    Initials Name Provider Type    Chen Stanley, PT Physical Therapist                Recommendation and Plan   PT Assessment/Plan     Row Name 03/01/23 0845          PT Assessment    Functional Limitations Other (comment)  wound management  -     Impairments Integumentary integrity;Edema;Impaired venous circulation  -     Assessment Comments Pt with continued improvements in BLE skin integrity and BLE edema, and also reports improved pain and functional mobility.  Pt adequately verbalizes understanding of home edema management and skin care, is appropriate to tentatively d/c to home management with PRN follow-up until end of certification 5/11/23.  -        PT Plan    PT Plan Comments tentative d/c until 5/11/23  -           User Key  (r) = Recorded By, (t) = Taken By, (c) = Cosigned By    Initials Name Provider Type    Chen Stanley, PT Physical Therapist                Goals   PT OP Goals     Row Name 03/01/23 0845          PT Short Term Goals    STG 1 Verbalize signs/symptoms of infection and when to seek medical attention.  -     STG 1 Progress Met  -     STG 2 Decrease area of wounds by 50% as evidence of wound healing.  -     STG 2 Progress Met  -     STG 3 Demonstrate minimal remaining BLE edema to improve skin integrity.  -     STG 3 Progress Met  -        Long Term Goals    LTG 1 Demonstrate no remaining open wounds to allow for transition to home management.  -     LTG 1 Progress Progressing;Not Met  -     LTG 2 Demonstrate no remaining BLE edema to transition to home management.  -     LTG 2 Progress Ongoing;Not Met  -        Time Calculation    PT Goal Re-Cert Due Date 05/11/23  -           User Key  (r) = Recorded By,  (t) = Taken By, (c) = Cosigned By    Initials Name Provider Type    Chen Stanley, PT Physical Therapist                PT Goal Re-Cert Due Date: 05/11/23  PT Short Term Goals  STG 1: Verbalize signs/symptoms of infection and when to seek medical attention.  STG 1 Progress: Met  STG 2: Decrease area of wounds by 50% as evidence of wound healing.  STG 2 Progress: Met  STG 3: Demonstrate minimal remaining BLE edema to improve skin integrity.  STG 3 Progress: Met  Long Term Goals  LTG 1: Demonstrate no remaining open wounds to allow for transition to home management.  LTG 1 Progress: Progressing, Not Met  LTG 2: Demonstrate no remaining BLE edema to transition to home management.  LTG 2 Progress: Ongoing, Not Met      Time Calculation: Start Time: 0845  Untimed Charges  25668-[id number] comp below knee: 20  Total Minutes  Untimed Charges Total Minutes: 20   Total Minutes: 20  Therapy Charges for Today     Code Description Service Date Service Provider Modifiers Qty    46844189679 HC PT MULTI LAYER COMP SYS BELOW KNEE 3/1/2023 Chen Carter, PT GP 1                  Chen Carter, PT  3/1/2023

## 2023-03-07 ENCOUNTER — OFFICE VISIT (OUTPATIENT)
Dept: FAMILY MEDICINE CLINIC | Facility: CLINIC | Age: 68
End: 2023-03-07
Payer: MEDICARE

## 2023-03-07 ENCOUNTER — TRANSCRIBE ORDERS (OUTPATIENT)
Dept: LAB | Facility: HOSPITAL | Age: 68
End: 2023-03-07
Payer: MEDICARE

## 2023-03-07 ENCOUNTER — LAB (OUTPATIENT)
Dept: LAB | Facility: HOSPITAL | Age: 68
End: 2023-03-07
Payer: MEDICARE

## 2023-03-07 VITALS
TEMPERATURE: 98.2 F | HEIGHT: 74 IN | BODY MASS INDEX: 26.28 KG/M2 | HEART RATE: 69 BPM | RESPIRATION RATE: 17 BRPM | SYSTOLIC BLOOD PRESSURE: 142 MMHG | OXYGEN SATURATION: 98 % | WEIGHT: 204.8 LBS | DIASTOLIC BLOOD PRESSURE: 84 MMHG

## 2023-03-07 DIAGNOSIS — Z00.00 ENCOUNTER FOR ROUTINE ADULT HEALTH EXAMINATION WITHOUT ABNORMAL FINDINGS: ICD-10-CM

## 2023-03-07 DIAGNOSIS — Z12.11 SCREENING FOR COLON CANCER: ICD-10-CM

## 2023-03-07 DIAGNOSIS — D50.9 IRON DEFICIENCY ANEMIA, UNSPECIFIED IRON DEFICIENCY ANEMIA TYPE: ICD-10-CM

## 2023-03-07 DIAGNOSIS — N18.32 CHRONIC KIDNEY DISEASE (CKD) STAGE G3B/A1, MODERATELY DECREASED GLOMERULAR FILTRATION RATE (GFR) BETWEEN 30-44 ML/MIN/1.73 SQUARE METER AND ALBUMINURIA CREATININE RATIO LESS THAN 30 MG/G (CMS/H*: Primary | ICD-10-CM

## 2023-03-07 DIAGNOSIS — E78.5 HYPERLIPIDEMIA, UNSPECIFIED HYPERLIPIDEMIA TYPE: ICD-10-CM

## 2023-03-07 DIAGNOSIS — N25.81 SECONDARY HYPERPARATHYROIDISM OF RENAL ORIGIN: ICD-10-CM

## 2023-03-07 DIAGNOSIS — I87.8 VENOUS STASIS: ICD-10-CM

## 2023-03-07 DIAGNOSIS — E11.22 CONTROLLED TYPE 2 DIABETES MELLITUS WITH STAGE 3 CHRONIC KIDNEY DISEASE, WITH LONG-TERM CURRENT USE OF INSULIN: ICD-10-CM

## 2023-03-07 DIAGNOSIS — I10 ESSENTIAL HYPERTENSION: ICD-10-CM

## 2023-03-07 DIAGNOSIS — Z79.4 CONTROLLED TYPE 2 DIABETES MELLITUS WITH STAGE 3 CHRONIC KIDNEY DISEASE, WITH LONG-TERM CURRENT USE OF INSULIN: ICD-10-CM

## 2023-03-07 DIAGNOSIS — N18.30 CONTROLLED TYPE 2 DIABETES MELLITUS WITH STAGE 3 CHRONIC KIDNEY DISEASE, WITH LONG-TERM CURRENT USE OF INSULIN: ICD-10-CM

## 2023-03-07 DIAGNOSIS — N18.32 CHRONIC KIDNEY DISEASE (CKD) STAGE G3B/A1, MODERATELY DECREASED GLOMERULAR FILTRATION RATE (GFR) BETWEEN 30-44 ML/MIN/1.73 SQUARE METER AND ALBUMINURIA CREATININE RATIO LESS THAN 30 MG/G (CMS/H*: ICD-10-CM

## 2023-03-07 DIAGNOSIS — Z13.6 ENCOUNTER FOR ABDOMINAL AORTIC ANEURYSM (AAA) SCREENING: ICD-10-CM

## 2023-03-07 DIAGNOSIS — N18.32 STAGE 3B CHRONIC KIDNEY DISEASE: ICD-10-CM

## 2023-03-07 DIAGNOSIS — Z87.891 HISTORY OF SMOKING: ICD-10-CM

## 2023-03-07 LAB
25(OH)D3 SERPL-MCNC: 39 NG/ML (ref 30–100)
ALBUMIN SERPL-MCNC: 4.4 G/DL (ref 3.5–5.2)
ANION GAP SERPL CALCULATED.3IONS-SCNC: 12.2 MMOL/L (ref 5–15)
BASOPHILS # BLD AUTO: 0.07 10*3/MM3 (ref 0–0.2)
BASOPHILS NFR BLD AUTO: 0.9 % (ref 0–1.5)
BUN SERPL-MCNC: 29 MG/DL (ref 8–23)
BUN/CREAT SERPL: 13.4 (ref 7–25)
CALCIUM SPEC-SCNC: 9.7 MG/DL (ref 8.6–10.5)
CHLORIDE SERPL-SCNC: 101 MMOL/L (ref 98–107)
CK SERPL-CCNC: 73 U/L (ref 20–200)
CO2 SERPL-SCNC: 23.8 MMOL/L (ref 22–29)
CREAT SERPL-MCNC: 2.16 MG/DL (ref 0.76–1.27)
CREAT UR-MCNC: 17.8 MG/DL
DEPRECATED RDW RBC AUTO: 39.7 FL (ref 37–54)
EGFRCR SERPLBLD CKD-EPI 2021: 32.3 ML/MIN/1.73
EOSINOPHIL # BLD AUTO: 0.38 10*3/MM3 (ref 0–0.4)
EOSINOPHIL NFR BLD AUTO: 4.7 % (ref 0.3–6.2)
ERYTHROCYTE [DISTWIDTH] IN BLOOD BY AUTOMATED COUNT: 12.4 % (ref 12.3–15.4)
FERRITIN SERPL-MCNC: 39.7 NG/ML (ref 30–400)
GLUCOSE SERPL-MCNC: 134 MG/DL (ref 65–99)
HCT VFR BLD AUTO: 35.3 % (ref 37.5–51)
HGB BLD-MCNC: 12.1 G/DL (ref 13–17.7)
IMM GRANULOCYTES # BLD AUTO: 0.03 10*3/MM3 (ref 0–0.05)
IMM GRANULOCYTES NFR BLD AUTO: 0.4 % (ref 0–0.5)
IRON 24H UR-MRATE: 94 MCG/DL (ref 59–158)
IRON SATN MFR SERPL: 21 % (ref 20–50)
LYMPHOCYTES # BLD AUTO: 1.62 10*3/MM3 (ref 0.7–3.1)
LYMPHOCYTES NFR BLD AUTO: 20 % (ref 19.6–45.3)
MCH RBC QN AUTO: 30.5 PG (ref 26.6–33)
MCHC RBC AUTO-ENTMCNC: 34.3 G/DL (ref 31.5–35.7)
MCV RBC AUTO: 88.9 FL (ref 79–97)
MONOCYTES # BLD AUTO: 0.77 10*3/MM3 (ref 0.1–0.9)
MONOCYTES NFR BLD AUTO: 9.5 % (ref 5–12)
NEUTROPHILS NFR BLD AUTO: 5.22 10*3/MM3 (ref 1.7–7)
NEUTROPHILS NFR BLD AUTO: 64.5 % (ref 42.7–76)
NRBC BLD AUTO-RTO: 0 /100 WBC (ref 0–0.2)
PHOSPHATE SERPL-MCNC: 3.9 MG/DL (ref 2.5–4.5)
PLATELET # BLD AUTO: 172 10*3/MM3 (ref 140–450)
PMV BLD AUTO: 11.1 FL (ref 6–12)
POTASSIUM SERPL-SCNC: 4.6 MMOL/L (ref 3.5–5.2)
PROT ?TM UR-MCNC: 93.9 MG/DL
PROT/CREAT UR: 5275.3 MG/G CREA (ref 0–200)
PTH-INTACT SERPL-MCNC: 78.6 PG/ML (ref 15–65)
RBC # BLD AUTO: 3.97 10*6/MM3 (ref 4.14–5.8)
SODIUM SERPL-SCNC: 137 MMOL/L (ref 136–145)
TIBC SERPL-MCNC: 448 MCG/DL (ref 298–536)
TRANSFERRIN SERPL-MCNC: 301 MG/DL (ref 200–360)
WBC NRBC COR # BLD: 8.09 10*3/MM3 (ref 3.4–10.8)

## 2023-03-07 PROCEDURE — 83540 ASSAY OF IRON: CPT

## 2023-03-07 PROCEDURE — 84156 ASSAY OF PROTEIN URINE: CPT

## 2023-03-07 PROCEDURE — 86037 ANCA TITER EACH ANTIBODY: CPT

## 2023-03-07 PROCEDURE — 82550 ASSAY OF CK (CPK): CPT

## 2023-03-07 PROCEDURE — 36415 COLL VENOUS BLD VENIPUNCTURE: CPT

## 2023-03-07 PROCEDURE — 83970 ASSAY OF PARATHORMONE: CPT

## 2023-03-07 PROCEDURE — 1170F FXNL STATUS ASSESSED: CPT | Performed by: STUDENT IN AN ORGANIZED HEALTH CARE EDUCATION/TRAINING PROGRAM

## 2023-03-07 PROCEDURE — 82306 VITAMIN D 25 HYDROXY: CPT

## 2023-03-07 PROCEDURE — G0439 PPPS, SUBSEQ VISIT: HCPCS | Performed by: STUDENT IN AN ORGANIZED HEALTH CARE EDUCATION/TRAINING PROGRAM

## 2023-03-07 PROCEDURE — 1125F AMNT PAIN NOTED PAIN PRSNT: CPT | Performed by: STUDENT IN AN ORGANIZED HEALTH CARE EDUCATION/TRAINING PROGRAM

## 2023-03-07 PROCEDURE — 85025 COMPLETE CBC W/AUTO DIFF WBC: CPT

## 2023-03-07 PROCEDURE — 83516 IMMUNOASSAY NONANTIBODY: CPT

## 2023-03-07 PROCEDURE — 80069 RENAL FUNCTION PANEL: CPT

## 2023-03-07 PROCEDURE — 99397 PER PM REEVAL EST PAT 65+ YR: CPT | Performed by: STUDENT IN AN ORGANIZED HEALTH CARE EDUCATION/TRAINING PROGRAM

## 2023-03-07 PROCEDURE — 82570 ASSAY OF URINE CREATININE: CPT

## 2023-03-07 PROCEDURE — 84466 ASSAY OF TRANSFERRIN: CPT

## 2023-03-07 PROCEDURE — 86334 IMMUNOFIX E-PHORESIS SERUM: CPT

## 2023-03-07 PROCEDURE — 86038 ANTINUCLEAR ANTIBODIES: CPT

## 2023-03-07 PROCEDURE — 82784 ASSAY IGA/IGD/IGG/IGM EACH: CPT

## 2023-03-07 PROCEDURE — 3051F HG A1C>EQUAL 7.0%<8.0%: CPT | Performed by: STUDENT IN AN ORGANIZED HEALTH CARE EDUCATION/TRAINING PROGRAM

## 2023-03-07 PROCEDURE — 82728 ASSAY OF FERRITIN: CPT

## 2023-03-07 PROCEDURE — 1160F RVW MEDS BY RX/DR IN RCRD: CPT | Performed by: STUDENT IN AN ORGANIZED HEALTH CARE EDUCATION/TRAINING PROGRAM

## 2023-03-07 PROCEDURE — 84165 PROTEIN E-PHORESIS SERUM: CPT

## 2023-03-07 PROCEDURE — 84155 ASSAY OF PROTEIN SERUM: CPT

## 2023-03-07 RX ORDER — FUROSEMIDE 40 MG/1
40 TABLET ORAL DAILY
Qty: 90 TABLET | Refills: 1 | Status: SHIPPED | OUTPATIENT
Start: 2023-03-07

## 2023-03-07 NOTE — ASSESSMENT & PLAN NOTE
Significant worsening off lasix  Continue Lasix 40 mg   Compression  -Awaiting consult report from vascular surgery and given he may not have any sign of peripheral artery disease we can likely start the Xarelto which would been previously prescribed to him

## 2023-03-07 NOTE — PROGRESS NOTES
The ABCs of the Annual Wellness Visit  Subsequent Medicare Wellness Visit    Subjective    Justice Landeros is a 68 y.o. male who presents for a Subsequent Medicare Wellness Visit.    The following portions of the patient's history were reviewed and   updated as appropriate: allergies, current medications, past family history, past medical history, past social history, past surgical history and problem list.    Compared to one year ago, the patient feels his physical   health is better.    Compared to one year ago, the patient feels his mental   health is the same.    Recent Hospitalizations:  He was admitted within the past 365 days at hospital in [place].       Current Medical Providers:  Patient Care Team:  Mario Posada MD as PCP - General (Family Medicine)    Outpatient Medications Prior to Visit   Medication Sig Dispense Refill   • amLODIPine (NORVASC) 5 MG tablet Take 1 tablet by mouth 2 (Two) Times a Day. Takes one by mouth daily 180 tablet 3   • atorvastatin (LIPITOR) 80 MG tablet Take 1 tablet by mouth Every Night. 90 tablet 3   • Blood Glucose Monitoring Suppl (FreeStyle Lite) device Test daily before all meals/snacks and once before bedtime. 1 each 0   • Blood Glucose Monitoring Suppl (ONE TOUCH ULTRA 2) w/Device kit      • calcitriol (ROCALTROL) 0.25 MCG capsule Take 1 capsule by mouth Daily. Monday, Wednesday, & Friday 90 capsule 3   • empagliflozin (Jardiance) 10 MG tablet tablet Take 1 tablet by mouth Daily. 90 tablet 0   • gabapentin (NEURONTIN) 100 MG capsule Take 1 capsule by mouth 3 (Three) Times a Day. 90 capsule 2   • hydrALAZINE (APRESOLINE) 25 MG tablet Take 1 tablet by mouth 2 (Two) Times a Day. 180 tablet 3   • insulin glargine (LANTUS, SEMGLEE) 100 UNIT/ML injection Inject 40 Units under the skin into the appropriate area as directed Every Night. 45 mL 3   • Lancets (OneTouch Delica Plus [id number]) misc 1 each 4 (Four) Times a Day Before Meals & at Bedtime. 300 each 3   • levothyroxine  (SYNTHROID, LEVOTHROID) 100 MCG tablet Take 1 tablet by mouth Daily. 90 tablet 3   • losartan (COZAAR) 25 MG tablet Take 1 tablet by mouth Daily. Take one tablet by mouth daily 90 tablet 3   • metoprolol tartrate (LOPRESSOR) 50 MG tablet Take 1 tablet by mouth 2 (Two) Times a Day. 90 tablet 3   • OneTouch Ultra test strip CHECK BLOOD SUGAR 3 TIMES DAILY 300 each 3   • pantoprazole (PROTONIX) 20 MG EC tablet Take 1 tablet by mouth Daily. 90 tablet 3   • Rivaroxaban (Xarelto) 2.5 MG tablet Take 1 tablet by mouth 2 (Two) Times a Day. 180 tablet 3   • furosemide (LASIX) 20 MG tablet Take 2 tablets by mouth Daily. 180 tablet 3     No facility-administered medications prior to visit.       No opioid medication identified on active medication list. I have reviewed chart for other potential  high risk medication/s and harmful drug interactions in the elderly.          Aspirin is not on active medication list.  Aspirin use is not indicated based on review of current medical condition/s. Risk of harm outweighs potential benefits.  .    Patient Active Problem List   Diagnosis   • CAD (coronary artery disease)   • Controlled type 2 diabetes mellitus with chronic kidney disease, with long-term current use of insulin ([facility])   • Stage 3b chronic kidney disease ([facility])   • Chronic low back pain   • Chronic neck pain   • Degeneration of lumbar intervertebral disc   • ED (erectile dysfunction)   • Encounter for long-term (current) use of insulin ([facility])   • Essential hypertension   • Gastroesophageal reflux disease   • HLD (hyperlipidemia)   • Mixed hyperlipidemia   • Hypothyroidism   • Iron deficiency anemia   • Kidney stone   • Overweight with body mass index (BMI) 25.0-29.9   • Postsurgical aortocoronary bypass status   • Pulmonary hypertension ([facility])   • Vitamin D deficiency   • PAD (peripheral artery disease) ([facility])   • Other proteinuria   • Venous stasis     Advance Care Planning  Advance Directive is not on file.  ACP discussion was  "held with the patient during this visit. Patient does not have an advance directive, information provided.     Objective    Vitals:    23 0732   BP: 142/84   Pulse: 69   Resp: 17   Temp: 98.2 °F (36.8 °C)   SpO2: 98%   Weight: 92.9 kg (204 lb 12.8 oz)   Height: 188 cm (74\")   PainSc:   4   PainLoc: Leg     Estimated body mass index is 26.29 kg/m² as calculated from the following:    Height as of this encounter: 188 cm (74\").    Weight as of this encounter: 92.9 kg (204 lb 12.8 oz).    BMI is >= 25 and <30. (Overweight) The following options were offered after discussion;: nutrition counseling/recommendations      Does the patient have evidence of cognitive impairment? No    Lab Results   Component Value Date    HGBA1C 7.10 (H) 2023        HEALTH RISK ASSESSMENT    Smoking Status:  Social History     Tobacco Use   Smoking Status Former   • Packs/day: 1.00   • Years: 5.00   • Pack years: 5.00   • Types: Cigarettes   • Start date: 1975   • Quit date: 1985   • Years since quittin.2   Smokeless Tobacco Never     Alcohol Consumption:  Social History     Substance and Sexual Activity   Alcohol Use Never     Fall Risk Screen:    MARKIE Fall Risk Assessment was completed, and patient is at LOW risk for falls.Assessment completed on:3/7/2023    Depression Screening:  PHQ-2/PHQ-9 Depression Screening 3/7/2023   Little Interest or Pleasure in Doing Things 0-->not at all   Feeling Down, Depressed or Hopeless 0-->not at all   PHQ-9: Brief Depression Severity Measure Score 0       Health Habits and Functional and Cognitive Screening:  Functional & Cognitive Status 3/7/2023   Do you have difficulty preparing food and eating? No   Do you have difficulty bathing yourself, getting dressed or grooming yourself? No   Do you have difficulty using the toilet? No   Do you have difficulty moving around from place to place? No   Do you have trouble with steps or getting out of a bed or a chair? No   Current Diet Well " Balanced Diet   Dental Exam Not up to date   Eye Exam Not up to date   Exercise (times per week) 7 times per week   Current Exercises Include Walking   Do you need help using the phone?  No   Are you deaf or do you have serious difficulty hearing?  No   Do you need help with transportation? No   Do you need help shopping? No   Do you need help preparing meals?  No   Do you need help with housework?  No   Do you need help with laundry? No   Do you need help taking your medications? No   Do you need help managing money? No   Do you ever drive or ride in a car without wearing a seat belt? No   Have you felt unusual stress, anger or loneliness in the last month? No   Who do you live with? Alone   If you need help, do you have trouble finding someone available to you? No   Have you been bothered in the last four weeks by sexual problems? No   Do you have difficulty concentrating, remembering or making decisions? No       Age-appropriate Screening Schedule:  Refer to the list below for future screening recommendations based on patient's age, sex and/or medical conditions. Orders for these recommended tests are listed in the plan section. The patient has been provided with a written plan.    Health Maintenance   Topic Date Due   • COLORECTAL CANCER SCREENING  Never done   • TDAP/TD VACCINES (1 - Tdap) Never done   • ZOSTER VACCINE (1 of 2) Never done   • AAA SCREEN (ONE-TIME)  Never done   • COVID-19 Vaccine (3 - Booster) 03/09/2023 (Originally 10/28/2021)   • INFLUENZA VACCINE  03/31/2023 (Originally 8/1/2022)   • Pneumococcal Vaccine 65+ (1 - PCV) 02/07/2024 (Originally 3/7/1961)   • HEMOGLOBIN A1C  08/14/2023   • DIABETIC EYE EXAM  10/12/2023   • DIABETIC FOOT EXAM  11/10/2023   • LIPID PANEL  11/22/2023   • URINE MICROALBUMIN  11/22/2023   • ANNUAL WELLNESS VISIT  03/07/2024   • HEPATITIS C SCREENING  Completed                CMS Preventative Services Quick Reference  Risk Factors Identified During Encounter  None  Identified  The above risks/problems have been discussed with the patient.  Pertinent information has been shared with the patient in the After Visit Summary.  An After Visit Summary and PPPS were made available to the patient.    Follow Up:   Next Medicare Wellness visit to be scheduled in 1 year.               Diagnoses and all orders for this visit:    1. Encounter for routine adult health examination without abnormal findings    2. Stage 3b chronic kidney disease (HCC)  Assessment & Plan:  -f/u with nepho  -contine BP control  -continue losartan and jardiance.       3. Controlled type 2 diabetes mellitus with stage 3 chronic kidney disease, with long-term current use of insulin ([facility])  Assessment & Plan:  - Continue Lantus 40 units at night (sometimes he takes larger dose with larger meals which I do not recommend)  -Continue Jardiance, follow-up in 3 months  -Discussed healthy diet      4. Venous stasis  Assessment & Plan:  Significant worsening off lasix  Continue Lasix 40 mg   Compression  -Awaiting consult report from vascular surgery and given he may not have any sign of peripheral artery disease we can likely start the Xarelto which would been previously prescribed to him    Orders:  -     furosemide (Lasix) 40 MG tablet; Take 1 tablet by mouth Daily.  Dispense: 90 tablet; Refill: 1    5. Screening for colon cancer  -     Cologuard - Stool, Per Rectum; Future    6. History of smoking  -     [place] aaa screen limited; Future    7. Encounter for abdominal aortic aneurysm (AAA) screening  -     [place] aaa screen limited; Future    8. Essential hypertension  Assessment & Plan:  - Continue metoprolol losartan hydralazine  -May increase losartan next visit if not at goal blood pressure little bit better last visit        Recommend Tdap and Shingrix at the pharmacy  Continue healthy diet and physical activity  AAA ultrasound ordered  Cologuard ordered    Handicap placard signed for 3 months for chronic low back pain and  venous stasis    Mario Posada MD  Family Medicine - [place] [facility] [place]

## 2023-03-07 NOTE — ASSESSMENT & PLAN NOTE
- Continue metoprolol losartan hydralazine  -May increase losartan next visit if not at goal blood pressure little bit better last visit

## 2023-03-07 NOTE — PATIENT INSTRUCTIONS
Recommend shingrix (shingles vaccine and TDAP tetanus vaccine at pharmacy.   Medicare Wellness  Personal Prevention Plan of Service     Date of Office Visit:    Encounter Provider:  Mario Posada MD  Place of Service:  [facility]  Patient Name: Justice Landeros  :  1955    As part of the Medicare Wellness portion of your visit today, we are providing you with this personalized preventive plan of services (PPPS). This plan is based upon recommendations of the [place] Preventive Services Task Force (USPSTF) and the Advisory Committee on Immunization Practices (ACIP).    This lists the preventive care services that should be considered, and provides dates of when you are due. Items listed as completed are up-to-date and do not require any further intervention.    Health Maintenance   Topic Date Due    COLORECTAL CANCER SCREENING  Never done    TDAP/TD VACCINES (1 - Tdap) Never done    ZOSTER VACCINE (1 of 2) Never done    ANNUAL WELLNESS VISIT  Never done    AAA SCREEN (ONE-TIME)  Never done    COVID-19 Vaccine (3 - Booster) 2023 (Originally 10/28/2021)    INFLUENZA VACCINE  2023 (Originally 2022)    Pneumococcal Vaccine 65+ (1 - PCV) 2024 (Originally 3/7/1961)    HEMOGLOBIN A1C  2023    DIABETIC EYE EXAM  10/12/2023    DIABETIC FOOT EXAM  11/10/2023    LIPID PANEL  2023    URINE MICROALBUMIN  2023    HEPATITIS C SCREENING  Completed       Orders Placed This Encounter   Procedures    US aaa screen limited     Standing Status:   Future     Standing Expiration Date:   3/7/2024     Order Specific Question:   Is the patient a male between 65-75 years old and have smoked at least 100 cigarettes in their lifetime OR a male or female Medicare patient who has a family history of abdominal aoritc aneurysm?     Answer:   Yes     Order Specific Question:   Reason for Exam:     Answer:   AAA screen     Order Specific Question:   Release to patient      Answer:   Routine Release    Cologuard - Stool, Per Rectum     Standing Status:   Future     Order Specific Question:   Release to patient     Answer:   Routine Release       Return in about 3 months (around 6/7/2023) for Follow-up.

## 2023-03-07 NOTE — ASSESSMENT & PLAN NOTE
- Continue Lantus 40 units at night (sometimes he takes larger dose with larger meals which I do not recommend)  -Continue Jardiance, follow-up in 3 months  -Discussed healthy diet

## 2023-03-08 LAB
ALBUMIN SERPL ELPH-MCNC: 3.6 G/DL (ref 2.9–4.4)
ALBUMIN/GLOB SERPL: 1.1 {RATIO} (ref 0.7–1.7)
ALPHA1 GLOB SERPL ELPH-MCNC: 0.2 G/DL (ref 0–0.4)
ALPHA2 GLOB SERPL ELPH-MCNC: 1.1 G/DL (ref 0.4–1)
ANA SER QL: NEGATIVE
B-GLOBULIN SERPL ELPH-MCNC: 1.1 G/DL (ref 0.7–1.3)
C-ANCA TITR SER IF: ABNORMAL TITER
GAMMA GLOB SERPL ELPH-MCNC: 1.1 G/DL (ref 0.4–1.8)
GLOBULIN SER-MCNC: 3.5 G/DL (ref 2.2–3.9)
IGA SERPL-MCNC: 275 MG/DL (ref 61–437)
IGG SERPL-MCNC: 1129 MG/DL (ref 603–1613)
IGM SERPL-MCNC: 144 MG/DL (ref 20–172)
INTERPRETATION SERPL IEP-IMP: ABNORMAL
LABORATORY COMMENT REPORT: ABNORMAL
M PROTEIN SERPL ELPH-MCNC: ABNORMAL G/DL
MYELOPEROXIDASE AB SER IA-ACNC: <0.2 UNITS (ref 0–0.9)
P-ANCA ATYPICAL TITR SER IF: ABNORMAL TITER
P-ANCA TITR SER IF: ABNORMAL TITER
PROT SERPL-MCNC: 7.1 G/DL (ref 6–8.5)
PROTEINASE3 AB SER IA-ACNC: <0.2 UNITS (ref 0–0.9)

## 2023-03-14 DIAGNOSIS — Z79.4 TYPE 2 DIABETES MELLITUS WITH DIABETIC NEUROPATHY, WITH LONG-TERM CURRENT USE OF INSULIN: ICD-10-CM

## 2023-03-14 DIAGNOSIS — E11.40 TYPE 2 DIABETES MELLITUS WITH DIABETIC NEUROPATHY, WITH LONG-TERM CURRENT USE OF INSULIN: ICD-10-CM

## 2023-03-15 RX ORDER — GABAPENTIN 100 MG/1
CAPSULE ORAL
Qty: 90 CAPSULE | Refills: 11 | Status: SHIPPED | OUTPATIENT
Start: 2023-03-15

## 2023-03-15 NOTE — TELEPHONE ENCOUNTER
Rx Refill Note  Requested Prescriptions     Pending Prescriptions Disp Refills   • gabapentin (NEURONTIN) 100 MG capsule [Pharmacy Med Name: Gabapentin 100 MG Oral Capsule] 90 capsule 11     Sig: TAKE 1 CAPSULE BY MOUTH 3 TIMES  DAILY      Last office visit with prescribing clinician: 3/7/2023   Last telemedicine visit with prescribing clinician: 6/7/2023   Next office visit with prescribing clinician: 6/7/2023                         Would you like a call back once the refill request has been completed: [] Yes [] No    If the office needs to give you a call back, can they leave a voicemail: [] Yes [] No    [place] MA  03/15/23, 09:47 EDT

## 2023-03-16 ENCOUNTER — TELEPHONE (OUTPATIENT)
Dept: FAMILY MEDICINE CLINIC | Facility: CLINIC | Age: 68
End: 2023-03-16
Payer: MEDICARE

## 2023-03-16 RX ORDER — PEN NEEDLE, DIABETIC 30 GX3/16"
1 NEEDLE, DISPOSABLE MISCELLANEOUS DAILY
Qty: 90 EACH | Refills: 3 | OUTPATIENT
Start: 2023-03-16

## 2023-03-16 NOTE — TELEPHONE ENCOUNTER
Pt came in requesting a call about his prescription for insulin pen needles 32 X 4 MM. Called pharmacy to verified they received the prescription but pharmacy said they have never received it. He is needing them sent to [facility] [place], [place] [address] their phone number is [phone].

## 2023-03-16 NOTE — TELEPHONE ENCOUNTER
ABELARDO SENT OVER A SCRIPT FOR ISHAN [id number] UF ATUL.  I DON'T SEE THIS ON HIS MED LIST.  IS IT NEEDED?

## 2023-03-23 DIAGNOSIS — N18.32 STAGE 3B CHRONIC KIDNEY DISEASE: ICD-10-CM

## 2023-03-23 DIAGNOSIS — R80.9 PROTEINURIA, UNSPECIFIED TYPE: ICD-10-CM

## 2023-03-23 DIAGNOSIS — N18.30 CONTROLLED TYPE 2 DIABETES MELLITUS WITH STAGE 3 CHRONIC KIDNEY DISEASE, WITH LONG-TERM CURRENT USE OF INSULIN: ICD-10-CM

## 2023-03-23 DIAGNOSIS — I73.9 PAD (PERIPHERAL ARTERY DISEASE): ICD-10-CM

## 2023-03-23 DIAGNOSIS — Z79.4 CONTROLLED TYPE 2 DIABETES MELLITUS WITH STAGE 3 CHRONIC KIDNEY DISEASE, WITH LONG-TERM CURRENT USE OF INSULIN: ICD-10-CM

## 2023-03-23 DIAGNOSIS — E11.22 CONTROLLED TYPE 2 DIABETES MELLITUS WITH STAGE 3 CHRONIC KIDNEY DISEASE, WITH LONG-TERM CURRENT USE OF INSULIN: ICD-10-CM

## 2023-03-23 NOTE — TELEPHONE ENCOUNTER
Please call [facility] to get there consult report for this patient.     I need this before refilling these medicines     Mario Posada MD  Family Medicine - [facility] [place]

## 2023-03-23 NOTE — TELEPHONE ENCOUNTER
I called and lm for [facility] to send us most recent note,lm for medical records.

## 2023-03-23 NOTE — TELEPHONE ENCOUNTER
Rx Refill Note  Requested Prescriptions     Pending Prescriptions Disp Refills   • empagliflozin (Jardiance) 10 MG tablet tablet 90 tablet 0     Sig: Take 1 tablet by mouth Daily.   • Rivaroxaban (Xarelto) 2.5 MG tablet 180 tablet 3     Sig: Take 1 tablet by mouth 2 (Two) Times a Day.      Last office visit with prescribing clinician: 3/7/2023   Last telemedicine visit with prescribing clinician: 6/7/2023   Next office visit with prescribing clinician: 6/7/2023                         Would you like a call back once the refill request has been completed: [] Yes [] No    If the office needs to give you a call back, can they leave a voicemail: [] Yes [] No    Ruthy Bustillos  03/23/23, 11:37 EDT

## 2023-03-23 NOTE — TELEPHONE ENCOUNTER
Rx Refill Note  Requested Prescriptions     Pending Prescriptions Disp Refills   • empagliflozin (Jardiance) 10 MG tablet tablet 90 tablet 0     Sig: Take 1 tablet by mouth Daily.   • Rivaroxaban (Xarelto) 2.5 MG tablet 180 tablet 3     Sig: Take 1 tablet by mouth 2 (Two) Times a Day.      Last office visit with prescribing clinician: 3/7/2023   Last telemedicine visit with prescribing clinician: 6/7/2023   Next office visit with prescribing clinician: 6/7/2023                         Would you like a call back once the refill request has been completed: [] Yes [] No    If the office needs to give you a call back, can they leave a voicemail: [] Yes [] No    Summer Choudhury MA  03/23/23, 13:48 EDT

## 2023-03-24 ENCOUNTER — TELEPHONE (OUTPATIENT)
Dept: FAMILY MEDICINE CLINIC | Facility: CLINIC | Age: 68
End: 2023-03-24
Payer: MEDICARE

## 2023-03-24 NOTE — TELEPHONE ENCOUNTER
Hub and front can read    I tried to call the pt top relay Dr. Martin message    Please let patient I am going to stop the Eliquis at this time and not refill it As I do not believe this medication is needed at this time..     I do recommend 81 mg baby aspirin daily.

## 2023-03-24 NOTE — TELEPHONE ENCOUNTER
Please let patient I am going to stop the Eliquis at this time and not refill it As I do not believe this medication is needed at this time..    I do recommend 81 mg baby aspirin daily.

## 2023-03-24 NOTE — TELEPHONE ENCOUNTER
I LET THE PATIENT KNOW  YOU STOPPED XARELTO, NOT ELIQUIS, AND HE SHOULD START TAKING THE 81 MG BABY ASPIRIN.

## 2023-04-18 ENCOUNTER — TELEPHONE (OUTPATIENT)
Dept: FAMILY MEDICINE CLINIC | Facility: CLINIC | Age: 68
End: 2023-04-18

## 2023-04-18 NOTE — TELEPHONE ENCOUNTER
Caller: Justice Landeros    Relationship: Self    Best call back number: [phone]    What is the medical concern/diagnosis: KIDNEY DISEASE     What specialty or service is being requested: KIDNEY BIOPSY       Any additional details: PATIENT SAW SOMEONE IN  NEPHROLOGY AND WAS TOLD HE NEEDS TO HAVE A KIDNEY BIOPSY AND HIS PCP WOULD SCHEDULE AN APPOINTMENT.     PATIENT IS CONFUSED ON THE SCHEDULING SITUATION AND WOULD LIKE SOMEONE TO CONTACT HIM TO GO OVER THE PROCESS . PATIENT WOULD LIKE TO BE ASSURED THAT HIS MEDICAL NEEDS ARE BEING TAKEN CARE OF WITH A SENSE OF URGENCY

## 2023-04-26 ENCOUNTER — TELEPHONE (OUTPATIENT)
Dept: FAMILY MEDICINE CLINIC | Facility: CLINIC | Age: 68
End: 2023-04-26

## 2023-04-26 NOTE — TELEPHONE ENCOUNTER
Caller: Justice Landeros    Relationship: Self    Best call back number: [phone]    What medication are you requesting: cyclobenzaprine (FLEXERIL) 10 MG tablet     What are your current symptoms: RUPTURED DISK IN THE BACK  IN A LOT OF PAIN    How long have you been experiencing symptoms:OFF AND ON     Have you had these symptoms before:    [x] Yes  [] No    Have you been treated for these symptoms before:   [x] Yes  [] No    If a prescription is needed, what is your preferred pharmacy and phone number: [facility] - [place], [place] - [address] [phone] [facility] [phone]      Additional notes: PATIENT IS IN PAIN AGAIN. PATIENT TAKE THIS MEDICATION AS NEEDED.     PLEASE CALL PATIENT IF THE REQUEST IS APPROVED AND SENT TO THE PHARMACY.

## 2023-05-01 ENCOUNTER — HOSPITAL ENCOUNTER (OUTPATIENT)
Dept: ULTRASOUND IMAGING | Facility: HOSPITAL | Age: 68
Discharge: HOME | End: 2023-05-01
Admitting: STUDENT IN AN ORGANIZED HEALTH CARE EDUCATION/TRAINING PROGRAM
Payer: MEDICARE

## 2023-05-01 DIAGNOSIS — Z13.6 ENCOUNTER FOR ABDOMINAL AORTIC ANEURYSM (AAA) SCREENING: ICD-10-CM

## 2023-05-01 DIAGNOSIS — Z87.891 HISTORY OF SMOKING: ICD-10-CM

## 2023-05-01 PROBLEM — I71.41 PARARENAL ABDOMINAL AORTIC ANEURYSM (AAA) WITHOUT RUPTURE: Status: ACTIVE | Noted: 2023-05-01

## 2023-05-01 PROCEDURE — 76706 US ABDL AORTA SCREEN AAA: CPT

## 2023-05-08 ENCOUNTER — HOSPITAL ENCOUNTER (OUTPATIENT)
Dept: CT IMAGING | Facility: HOSPITAL | Age: 68
Discharge: HOME | End: 2023-05-08
Admitting: INTERNAL MEDICINE
Payer: MEDICARE

## 2023-05-08 VITALS
SYSTOLIC BLOOD PRESSURE: 151 MMHG | RESPIRATION RATE: 12 BRPM | WEIGHT: 195.4 LBS | HEIGHT: 74 IN | TEMPERATURE: 97.7 F | OXYGEN SATURATION: 98 % | DIASTOLIC BLOOD PRESSURE: 81 MMHG | BODY MASS INDEX: 25.08 KG/M2 | HEART RATE: 69 BPM

## 2023-05-08 DIAGNOSIS — N18.32 STAGE 3B CHRONIC KIDNEY DISEASE: ICD-10-CM

## 2023-05-08 LAB
ANION GAP SERPL CALCULATED.3IONS-SCNC: 12 MMOL/L (ref 5–15)
BASOPHILS # BLD AUTO: 0.07 10*3/MM3 (ref 0–0.2)
BASOPHILS NFR BLD AUTO: 0.8 % (ref 0–1.5)
BUN SERPL-MCNC: 31 MG/DL (ref 8–23)
BUN/CREAT SERPL: 13.9 (ref 7–25)
CALCIUM SPEC-SCNC: 9.2 MG/DL (ref 8.6–10.5)
CHLORIDE SERPL-SCNC: 102 MMOL/L (ref 98–107)
CO2 SERPL-SCNC: 24 MMOL/L (ref 22–29)
CREAT SERPL-MCNC: 2.23 MG/DL (ref 0.76–1.27)
DEPRECATED RDW RBC AUTO: 42 FL (ref 37–54)
EGFRCR SERPLBLD CKD-EPI 2021: 31.1 ML/MIN/1.73
EOSINOPHIL # BLD AUTO: 0.28 10*3/MM3 (ref 0–0.4)
EOSINOPHIL NFR BLD AUTO: 3.4 % (ref 0.3–6.2)
ERYTHROCYTE [DISTWIDTH] IN BLOOD BY AUTOMATED COUNT: 12.7 % (ref 12.3–15.4)
GLUCOSE BLDC GLUCOMTR-MCNC: 124 MG/DL (ref 70–130)
GLUCOSE SERPL-MCNC: 125 MG/DL (ref 65–99)
HCT VFR BLD AUTO: 37.1 % (ref 37.5–51)
HGB BLD-MCNC: 12.2 G/DL (ref 13–17.7)
IMM GRANULOCYTES # BLD AUTO: 0.03 10*3/MM3 (ref 0–0.05)
IMM GRANULOCYTES NFR BLD AUTO: 0.4 % (ref 0–0.5)
INR PPP: 1 (ref 0.89–1.12)
LYMPHOCYTES # BLD AUTO: 1.71 10*3/MM3 (ref 0.7–3.1)
LYMPHOCYTES NFR BLD AUTO: 20.8 % (ref 19.6–45.3)
MCH RBC QN AUTO: 30.2 PG (ref 26.6–33)
MCHC RBC AUTO-ENTMCNC: 32.9 G/DL (ref 31.5–35.7)
MCV RBC AUTO: 91.8 FL (ref 79–97)
MONOCYTES # BLD AUTO: 0.84 10*3/MM3 (ref 0.1–0.9)
MONOCYTES NFR BLD AUTO: 10.2 % (ref 5–12)
NEUTROPHILS NFR BLD AUTO: 5.31 10*3/MM3 (ref 1.7–7)
NEUTROPHILS NFR BLD AUTO: 64.4 % (ref 42.7–76)
NRBC BLD AUTO-RTO: 0 /100 WBC (ref 0–0.2)
PLATELET # BLD AUTO: 201 10*3/MM3 (ref 140–450)
PMV BLD AUTO: 10.7 FL (ref 6–12)
POTASSIUM SERPL-SCNC: 4.4 MMOL/L (ref 3.5–5.2)
PROTHROMBIN TIME: 13.3 SECONDS (ref 12.2–14.5)
RBC # BLD AUTO: 4.04 10*6/MM3 (ref 4.14–5.8)
SODIUM SERPL-SCNC: 138 MMOL/L (ref 136–145)
WBC NRBC COR # BLD: 8.24 10*3/MM3 (ref 3.4–10.8)

## 2023-05-08 PROCEDURE — 85610 PROTHROMBIN TIME: CPT | Performed by: RADIOLOGY

## 2023-05-08 PROCEDURE — 88350 IMFLUOR EA ADDL 1ANTB STN PX: CPT | Performed by: INTERNAL MEDICINE

## 2023-05-08 PROCEDURE — 82948 REAGENT STRIP/BLOOD GLUCOSE: CPT

## 2023-05-08 PROCEDURE — 0 LIDOCAINE 1 % SOLUTION: Performed by: RADIOLOGY

## 2023-05-08 PROCEDURE — 99153 MOD SED SAME PHYS/QHP EA: CPT

## 2023-05-08 PROCEDURE — 99152 MOD SED SAME PHYS/QHP 5/>YRS: CPT

## 2023-05-08 PROCEDURE — 25010000002 FENTANYL CITRATE (PF) 50 MCG/ML SOLUTION: Performed by: RADIOLOGY

## 2023-05-08 PROCEDURE — 88313 SPECIAL STAINS GROUP 2: CPT | Performed by: INTERNAL MEDICINE

## 2023-05-08 PROCEDURE — 77012 CT SCAN FOR NEEDLE BIOPSY: CPT

## 2023-05-08 PROCEDURE — 88348 ELECTRON MICROSCOPY DX: CPT | Performed by: INTERNAL MEDICINE

## 2023-05-08 PROCEDURE — 88346 IMFLUOR 1ST 1ANTB STAIN PX: CPT | Performed by: INTERNAL MEDICINE

## 2023-05-08 PROCEDURE — 80048 BASIC METABOLIC PNL TOTAL CA: CPT | Performed by: RADIOLOGY

## 2023-05-08 PROCEDURE — 85025 COMPLETE CBC W/AUTO DIFF WBC: CPT | Performed by: RADIOLOGY

## 2023-05-08 PROCEDURE — 88305 TISSUE EXAM BY PATHOLOGIST: CPT | Performed by: INTERNAL MEDICINE

## 2023-05-08 PROCEDURE — 25010000002 MIDAZOLAM PER 1 MG: Performed by: RADIOLOGY

## 2023-05-08 RX ORDER — FENTANYL CITRATE 50 UG/ML
INJECTION, SOLUTION INTRAMUSCULAR; INTRAVENOUS
Status: DISPENSED
Start: 2023-05-08 | End: 2023-05-08

## 2023-05-08 RX ORDER — LIDOCAINE HYDROCHLORIDE 10 MG/ML
20 INJECTION, SOLUTION INFILTRATION; PERINEURAL ONCE
Status: COMPLETED | OUTPATIENT
Start: 2023-05-08 | End: 2023-05-08

## 2023-05-08 RX ORDER — HYDROCODONE BITARTRATE AND ACETAMINOPHEN 5; 325 MG/1; MG/1
1 TABLET ORAL EVERY 4 HOURS PRN
Status: DISCONTINUED | OUTPATIENT
Start: 2023-05-08 | End: 2023-05-09 | Stop reason: HOSPADM

## 2023-05-08 RX ORDER — MIDAZOLAM HYDROCHLORIDE 1 MG/ML
INJECTION INTRAMUSCULAR; INTRAVENOUS
Status: DISPENSED
Start: 2023-05-08 | End: 2023-05-08

## 2023-05-08 RX ORDER — FENTANYL CITRATE 50 UG/ML
INJECTION, SOLUTION INTRAMUSCULAR; INTRAVENOUS AS NEEDED
Status: COMPLETED | OUTPATIENT
Start: 2023-05-08 | End: 2023-05-08

## 2023-05-08 RX ORDER — SODIUM CHLORIDE 0.9 % (FLUSH) 0.9 %
10 SYRINGE (ML) INJECTION AS NEEDED
Status: DISCONTINUED | OUTPATIENT
Start: 2023-05-08 | End: 2023-05-09 | Stop reason: HOSPADM

## 2023-05-08 RX ORDER — ASPIRIN 81 MG/1
81 TABLET ORAL DAILY
COMMUNITY

## 2023-05-08 RX ORDER — MIDAZOLAM HYDROCHLORIDE 1 MG/ML
INJECTION INTRAMUSCULAR; INTRAVENOUS AS NEEDED
Status: COMPLETED | OUTPATIENT
Start: 2023-05-08 | End: 2023-05-08

## 2023-05-08 RX ORDER — SODIUM CHLORIDE 0.9 % (FLUSH) 0.9 %
3 SYRINGE (ML) INJECTION EVERY 12 HOURS SCHEDULED
Status: DISCONTINUED | OUTPATIENT
Start: 2023-05-08 | End: 2023-05-09 | Stop reason: HOSPADM

## 2023-05-08 RX ADMIN — LIDOCAINE HYDROCHLORIDE 10 ML: 10 INJECTION, SOLUTION INFILTRATION; PERINEURAL at 08:47

## 2023-05-08 RX ADMIN — MIDAZOLAM HYDROCHLORIDE 1 MG: 1 INJECTION, SOLUTION INTRAMUSCULAR; INTRAVENOUS at 08:53

## 2023-05-08 RX ADMIN — FENTANYL CITRATE 50 MCG: 50 INJECTION, SOLUTION INTRAMUSCULAR; INTRAVENOUS at 08:53

## 2023-05-08 RX ADMIN — FENTANYL CITRATE 50 MCG: 50 INJECTION, SOLUTION INTRAMUSCULAR; INTRAVENOUS at 08:51

## 2023-05-08 RX ADMIN — MIDAZOLAM HYDROCHLORIDE 1 MG: 1 INJECTION, SOLUTION INTRAMUSCULAR; INTRAVENOUS at 08:51

## 2023-05-08 NOTE — NURSING NOTE
Pt to CT procedure room, assisted to CT table in prone position, placed on all monitors. SR on monitor, HR 60s, no ectopy. A&Ox4. VSS. No complaints or distress noted at this time.

## 2023-05-08 NOTE — DISCHARGE INSTRUCTIONS
Avoid any strenuous activities, pulling, tugging, straining or lifting anything over 10 pounds for the next 7 days.    You may remove the bandaid (if you have one) tomorrow and shower tomorrow; but you will need to avoid tub baths or any situation where your are submersed in water for the next 4 or 5 days to avoid the risk of infection.     DO NOT DRIVE ON THE DAY OF YOUR PROCEDURE.    If you have any problems or concern please contact your physician's office.      SEEK IMMEDIATE MEDICAL ATTENTION FOR ANY UNCONTROLLED PAIN, INABILITY TO URINATE, BRIGHT RED BLOOD OR CLOTS IN URINE, OR ANY UNUSUAL BRUISING OR SWELLING AT OR NEAR BIOPSY SITE.

## 2023-05-08 NOTE — NURSING NOTE
Pt discharged from ir dept s/p renal biopsy. Dressing remains clean dry and intact at time of discharge, site without evidence of hematoma. Discharge instructions reviewed with patient who verbalized understanding. Pt transported to exit via wheelchair per nurse to meet with his ride.

## 2023-05-08 NOTE — POST-PROCEDURE NOTE
The following procedure was performed: Kidney bx    Please see corresponding Radiology report for in detail procedural information. The Radiology report will be dictated shortly, if not done so already. Please see the IR RN note for the information regarding medicines administered if any, elvis-procedural vitals and I/O information.

## 2023-05-08 NOTE — NURSING NOTE
Image guided left renal biopsy performed by Dr Dinh. 3 samples obtained and sent to the lab for testing. Patient was sedated for 24 minutes, and given 2 mg Versed and 100 mcg Fentanyl. Patient tolerated well. Dsg to site per protocol. Patient positioned on left side and placed on bedrest for 6 hours. Report called to VANITA nurse. DLP - [phone].

## 2023-05-08 NOTE — PRE-PROCEDURE NOTE
[facility]   Vascular Interventional Radiology  History & Physicial    Pertinent information including components of history, physical examination, review of systems, lab and imaging data, and impression and plan from this source was also reviewed for the creation of the following pre-procedural note: Progress Notes by Mario Posada MD (2023 07:30)         Patient Name:Justice Landeros    : 1955    MRN: [id number]    Primary Care Physician: Mario Posada MD    Referring Physician: Lacy Duffy MD     Date of admission: 2023    Subjective     Reason for Consult: Kidney biopsy    History of Present Illness     Justice Landeros is a 68 y.o. male referred to IR as noted above.      Active Hospital Problems:  There are no active hospital problems to display for this patient.      Personal History     Past Medical History:   Diagnosis Date    CKD (chronic kidney disease)     Constipation     Coronary artery disease     Diabetes mellitus     Disease of thyroid gland     GERD (gastroesophageal reflux disease)     Hyperlipidemia     Hypertension     Weak urine stream        Past Surgical History:   Procedure Laterality Date    CORONARY ARTERY BYPASS GRAFT      . [facility]    KNEE SURGERY             Family History: His family history is not on file.     Social History: He  reports that he quit smoking about 38 years ago. His smoking use included cigarettes. He started smoking about 48 years ago. He has a 5.00 pack-year smoking history. He has never used smokeless tobacco. He reports that he does not drink alcohol and does not use drugs.    Home Medications:  FreeStyle Lite, ONE TOUCH ULTRA 2, OneTouch Delica Plus [id number], Pen Needles, amLODIPine, aspirin, atorvastatin, calcitriol, empagliflozin, furosemide, gabapentin, glucose blood, hydrALAZINE, insulin glargine, levothyroxine, losartan, metoprolol tartrate, and pantoprazole    Current Medications:     "HYDROcodone-acetaminophen    sodium chloride    sodium chloride     Allergies:  He is allergic to farxiga [dapagliflozin].    Review of Systems    IR Procedure pertinent significant findings are mentioned in the PMH and PSH above.    Objective     Visit Vitals  /81 (BP Location: Left arm, Patient Position: Sitting)   Pulse 69   Temp 97.7 °F (36.5 °C) (Temporal)   Resp 12   Ht 188 cm (74\")   Wt 88.6 kg (195 lb 6.4 oz)   SpO2 98%   BMI 25.09 kg/m²        Physical Exam    A&Ox3.   Able to communicate  No Apparent Distress  Average physique   CVS: Regular rate and rhythm  Respiratory: Non labored breathing. No signs of respiratory compromise.    Result Review      I have personally reviewed the results from the time of this admission to 5/8/2023 16:31 EDT and agree with these findings.  [x]  Laboratory  []  Microbiology  [x]  Radiology  []  EKG/Telemetry   []  Cardiology/Vascular   []  Pathology  []  Old records  []  Other:    Most notable findings include: As noted:    Results from last 7 days   Lab Units 05/08/23  0659   INR  1.00   HEMOGLOBIN g/dL 12.2*   HEMATOCRIT % 37.1*   PLATELETS 10*3/mm3 201       Estimated Creatinine Clearance: 39.2 mL/min (A) (by C-G formula based on SCr of 2.23 mg/dL (H)).   Creatinine   Date Value Ref Range Status   05/08/2023 2.23 (H) 0.76 - 1.27 mg/dL Final       No results found for: COVID19     No results found for: PREGTESTUR, PREGSERUM, HCG, HCGQUANT     ASA SCALE ASSESSMENT (applicable ONLY if sedation planned):   3     MALLAMPATI CLASSIFICATION (applicable ONLY if sedation planned):   2    Assessment / Plan     Justice Landeros is a 68 y.o. male referred to the IR service with above problem.    Plan:   As above.    Notice: The note was created before the performance of the procedure. It might have been left in the pending status and signed off after the procedure. The time stamp on the note may be misleading.    Fuad Dinh MD   Vascular Interventional " Radiology  05/08/23   8:14 AM EDT

## 2023-05-12 LAB
LAB AP CASE REPORT: NORMAL
LAB AP CLINICAL INFORMATION: NORMAL
PATH REPORT.FINAL DX SPEC: NORMAL

## 2023-05-17 ENCOUNTER — DOCUMENTATION (OUTPATIENT)
Dept: PHYSICAL THERAPY | Facility: HOSPITAL | Age: 68
End: 2023-05-17
Payer: MEDICARE

## 2023-05-17 DIAGNOSIS — S81.801D MULTIPLE OPEN WOUNDS OF LOWER LEG, RIGHT, SUBSEQUENT ENCOUNTER: Primary | ICD-10-CM

## 2023-05-17 DIAGNOSIS — I87.2 VENOUS INSUFFICIENCY OF BOTH LOWER EXTREMITIES: ICD-10-CM

## 2023-05-17 DIAGNOSIS — R60.0 BILATERAL EDEMA OF LOWER EXTREMITY: ICD-10-CM

## 2023-05-17 DIAGNOSIS — S81.802D MULTIPLE OPEN WOUNDS OF LOWER LEG, LEFT, SUBSEQUENT ENCOUNTER: ICD-10-CM

## 2023-05-17 NOTE — THERAPY DISCHARGE NOTE
Outpatient Rehabilitation - Wound/Debridement Discharge Summary       Patient Name: Justice Landeros  : 1955  MRN: [id number]  Today's Date: 2023                  Admit Date: (Not on file)    Visit Dx:    ICD-10-CM ICD-9-CM   1. Multiple open wounds of lower leg, right, subsequent encounter  S81.801D V58.89     894.0   2. Multiple open wounds of lower leg, left, subsequent encounter  S81.802D V58.89     894.0   3. Bilateral edema of lower extremity  R60.0 782.3   4. Venous insufficiency of both lower extremities  I87.2 459.81       Patient Active Problem List   Diagnosis   • CAD (coronary artery disease)   • Controlled type 2 diabetes mellitus with chronic kidney disease, with long-term current use of insulin   • Stage 3b chronic kidney disease   • Chronic low back pain   • Chronic neck pain   • Degeneration of lumbar intervertebral disc   • ED (erectile dysfunction)   • Encounter for long-term (current) use of insulin   • Essential hypertension   • Gastroesophageal reflux disease   • HLD (hyperlipidemia)   • Mixed hyperlipidemia   • Hypothyroidism   • Iron deficiency anemia   • Kidney stone   • Overweight with body mass index (BMI) 25.0-29.9   • Postsurgical aortocoronary bypass status   • Pulmonary hypertension   • Vitamin D deficiency   • PAD (peripheral artery disease)   • Other proteinuria   • Venous stasis   • Pararenal abdominal aortic aneurysm (AAA) without rupture        Past Medical History:   Diagnosis Date   • CKD (chronic kidney disease)    • Constipation    • Coronary artery disease    • Diabetes mellitus    • Disease of thyroid gland    • GERD (gastroesophageal reflux disease)    • Hyperlipidemia    • Hypertension    • Weak urine stream         Past Surgical History:   Procedure Laterality Date   • CORONARY ARTERY BYPASS GRAFT      . [facility]   • KNEE SURGERY      1985       Goals   PT OP Goals     Row Name 23 1500          PT Short Term Goals    STG 1  Verbalize signs/symptoms of infection and when to seek medical attention.  -     STG 1 Progress Met  -     STG 2 Decrease area of wounds by 50% as evidence of wound healing.  -     STG 2 Progress Met  -     STG 3 Demonstrate minimal remaining BLE edema to improve skin integrity.  -     STG 3 Progress Met  -        Long Term Goals    LTG 1 Demonstrate no remaining open wounds to allow for transition to home management.  -     LTG 1 Progress Progressing;Not Met  -     LTG 2 Demonstrate no remaining BLE edema to transition to home management.  -     LTG 2 Progress Ongoing;Not Met  -           User Key  (r) = Recorded By, (t) = Taken By, (c) = Cosigned By    Initials Name Provider Type    Chen Stanley, PT Physical Therapist                 OP Discharge Summary     Row Name 05/17/23 1557             OP PT Discharge Summary    Date of Discharge 05/17/23  -      Reason for Discharge Independent  -      Outcomes Achieved Patient able to partially acheive established goals  -      Discharge Destination Home with home program  -      Discharge Instructions/Additional Comments Patient tentatively d/c'd last tx 3/1/23 with instruction in continued compression use for BLE edema, was to return PRN and has not made any further appts.  -            User Key  (r) = Recorded By, (t) = Taken By, (c) = Cosigned By    Initials Name Provider Type    Chen Stanley, PT Physical Therapist                Chen Carter, PT  5/17/2023

## 2023-05-18 DIAGNOSIS — I87.8 VENOUS STASIS: ICD-10-CM

## 2023-05-19 RX ORDER — FUROSEMIDE 40 MG/1
40 TABLET ORAL DAILY
Qty: 100 TABLET | Refills: 2 | Status: SHIPPED | OUTPATIENT
Start: 2023-05-19

## 2023-05-25 RX ORDER — METOPROLOL TARTRATE 50 MG/1
TABLET, FILM COATED ORAL
Qty: 180 TABLET | Refills: 3 | Status: SHIPPED | OUTPATIENT
Start: 2023-05-25

## 2023-05-31 ENCOUNTER — TELEPHONE (OUTPATIENT)
Dept: FAMILY MEDICINE CLINIC | Facility: CLINIC | Age: 68
End: 2023-05-31

## 2023-05-31 NOTE — TELEPHONE ENCOUNTER
Caller: Justice Landeros    Relationship: Self    Best call back number: [phone]    Caller requesting test results: JUSTICE LANDEROS     What test was performed: BIOPSY ON LEFT KIDNEY      When was the test performed: 5/8/23     Where was the test performed: [facility]     Additional notes:PATIENT WOULD LIKE A CALL BACK WITH THE RESULTS TO THIS BECAUSE HE HAS NOT RECEIVED THOSE YET.

## 2023-05-31 NOTE — TELEPHONE ENCOUNTER
I called patient and lm for him to call us back if needed but to seek results further from kidney provider.

## 2023-06-07 ENCOUNTER — OFFICE VISIT (OUTPATIENT)
Dept: FAMILY MEDICINE CLINIC | Facility: CLINIC | Age: 68
End: 2023-06-07
Payer: MEDICARE

## 2023-06-07 VITALS
RESPIRATION RATE: 18 BRPM | SYSTOLIC BLOOD PRESSURE: 124 MMHG | BODY MASS INDEX: 25.49 KG/M2 | HEART RATE: 68 BPM | TEMPERATURE: 98.2 F | DIASTOLIC BLOOD PRESSURE: 76 MMHG | HEIGHT: 74 IN | WEIGHT: 198.6 LBS

## 2023-06-07 DIAGNOSIS — M51.36 DEGENERATION OF LUMBAR INTERVERTEBRAL DISC: ICD-10-CM

## 2023-06-07 DIAGNOSIS — I87.2 VENOUS INSUFFICIENCY: ICD-10-CM

## 2023-06-07 DIAGNOSIS — E11.40 TYPE 2 DIABETES MELLITUS WITH DIABETIC NEUROPATHY, WITH LONG-TERM CURRENT USE OF INSULIN: ICD-10-CM

## 2023-06-07 DIAGNOSIS — Z79.4 TYPE 2 DIABETES MELLITUS WITH DIABETIC NEUROPATHY, WITH LONG-TERM CURRENT USE OF INSULIN: ICD-10-CM

## 2023-06-07 DIAGNOSIS — I87.8 VENOUS STASIS: ICD-10-CM

## 2023-06-07 DIAGNOSIS — N18.32 STAGE 3B CHRONIC KIDNEY DISEASE: ICD-10-CM

## 2023-06-07 DIAGNOSIS — E11.42 DIABETIC POLYNEUROPATHY ASSOCIATED WITH TYPE 2 DIABETES MELLITUS: ICD-10-CM

## 2023-06-07 PROBLEM — I73.9 PAD (PERIPHERAL ARTERY DISEASE): Status: RESOLVED | Noted: 2022-11-10 | Resolved: 2023-06-07

## 2023-06-07 LAB
EXPIRATION DATE: NORMAL
HBA1C MFR BLD: 7.1 %
Lab: NORMAL

## 2023-06-07 RX ORDER — GABAPENTIN 100 MG/1
400 CAPSULE ORAL NIGHTLY
Qty: 120 CAPSULE | Refills: 2 | Status: SHIPPED | OUTPATIENT
Start: 2023-06-07

## 2023-06-07 RX ORDER — FUROSEMIDE 40 MG/1
40 TABLET ORAL DAILY
Qty: 90 TABLET | Refills: 3 | Status: SHIPPED | OUTPATIENT
Start: 2023-06-07

## 2023-06-07 RX ORDER — FOLIC ACID 1 MG/1
TABLET ORAL
COMMUNITY
Start: 2023-04-26

## 2023-06-07 NOTE — PROGRESS NOTES
Established Patient Office Visit        Subjective      Chief Complaint:  Diabetes and Peripheral Neuropathy (Pt states that he is having numbness and pain in toes in R foot.)      History of Present Illness: Justice Landeros is a 68 y.o. male who presents for diabetes and right toe pain an burning.     Burning to the right toes is worsening       Patient Active Problem List   Diagnosis    CAD (coronary artery disease)    Controlled type 2 diabetes mellitus with chronic kidney disease, with long-term current use of insulin    Stage 3b chronic kidney disease    Chronic low back pain    Chronic neck pain    Degeneration of lumbar intervertebral disc    ED (erectile dysfunction)    Encounter for long-term (current) use of insulin    Essential hypertension    Gastroesophageal reflux disease    HLD (hyperlipidemia)    Mixed hyperlipidemia    Hypothyroidism    Iron deficiency anemia    Kidney stone    Overweight with body mass index (BMI) 25.0-29.9    Postsurgical aortocoronary bypass status    Pulmonary hypertension    Vitamin D deficiency    Other proteinuria    Venous stasis    Pararenal abdominal aortic aneurysm (AAA) without rupture    Diabetic polyneuropathy associated with type 2 diabetes mellitus    Venous insufficiency         Current Outpatient Medications:     amLODIPine (NORVASC) 5 MG tablet, Take 1 tablet by mouth 2 (Two) Times a Day. Takes one by mouth daily, Disp: 180 tablet, Rfl: 3    aspirin 81 MG EC tablet, Take 1 tablet by mouth Daily., Disp: , Rfl:     atorvastatin (LIPITOR) 80 MG tablet, Take 1 tablet by mouth Every Night., Disp: 90 tablet, Rfl: 3    Blood Glucose Monitoring Suppl (FreeStyle Lite) device, Test daily before all meals/snacks and once before bedtime., Disp: 1 each, Rfl: 0    Blood Glucose Monitoring Suppl (ONE TOUCH ULTRA 2) w/Device kit, , Disp: , Rfl:     calcitriol (ROCALTROL) 0.25 MCG capsule, Take 1 capsule by mouth Daily. Monday, Wednesday, & Friday, Disp: 90 capsule, Rfl: 3    folic  "acid (FOLVITE) 1 MG tablet, , Disp: , Rfl:     furosemide (LASIX) 40 MG tablet, Take 1 tablet by mouth Daily., Disp: 90 tablet, Rfl: 3    gabapentin (NEURONTIN) 100 MG capsule, Take 4 capsules by mouth Every Night., Disp: 120 capsule, Rfl: 2    hydrALAZINE (APRESOLINE) 25 MG tablet, Take 1 tablet by mouth 2 (Two) Times a Day., Disp: 180 tablet, Rfl: 3    insulin glargine (LANTUS, SEMGLEE) 100 UNIT/ML injection, Inject 40 Units under the skin into the appropriate area as directed Every Night., Disp: 45 mL, Rfl: 3    Insulin Pen Needle (Pen Needles) 32G X 4 MM misc, 1 each Daily., Disp: 90 each, Rfl: 3    Lancets (OneTouch Delica Plus [id number]) misc, 1 each 4 (Four) Times a Day Before Meals & at Bedtime., Disp: 300 each, Rfl: 3    levothyroxine (SYNTHROID, LEVOTHROID) 100 MCG tablet, Take 1 tablet by mouth Daily., Disp: 90 tablet, Rfl: 3    losartan (COZAAR) 25 MG tablet, Take 1 tablet by mouth Daily. Take one tablet by mouth daily, Disp: 90 tablet, Rfl: 3    metoprolol tartrate (LOPRESSOR) 50 MG tablet, TAKE 1 TABLET BY MOUTH TWICE  DAILY, Disp: 180 tablet, Rfl: 3    OneTouch Ultra test strip, CHECK BLOOD SUGAR 3 TIMES DAILY, Disp: 300 each, Rfl: 3    pantoprazole (PROTONIX) 20 MG EC tablet, Take 1 tablet by mouth Daily., Disp: 90 tablet, Rfl: 3    empagliflozin (JARDIANCE) 25 MG tablet tablet, Take 1 tablet by mouth Daily., Disp: 90 tablet, Rfl: 3       Objective     Physical Exam:   Vital Signs:   /76   Pulse 68   Temp 98.2 °F (36.8 °C) (Infrared)   Resp 18   Ht 188 cm (74\")   Wt 90.1 kg (198 lb 9.6 oz)   BMI 25.50 kg/m²      Physical Exam  Constitutional:       General: He is not in acute distress.     Appearance: He is not ill-appearing.   Cardiovascular:      Rate and Rhythm: Normal rate and regular rhythm.   Pulmonary:      Effort: Pulmonary effort is normal.      Breath sounds: Normal breath sounds.   Neurological:      Mental Status: He is alert.   Psychiatric:         Thought Content: Thought " content normal.   Stasis changes to lower extremities with some mild brawny edema.  Small scab to the right posterior leg  Normal sensation to toes and feet bilaterally         Assessment / Plan      Assessment/Plan:   Diagnoses and all orders for this visit:    1. Stage 3b chronic kidney disease  Overview:  Kidney biopsy reviewed and shows severe diabetic nephropathy and arterionephrosclerosis with segmental sclerosis    Optimize pressure control and diabetic control    Increase Jardiance to 25 mg    Orders:  -     empagliflozin (JARDIANCE) 25 MG tablet tablet; Take 1 tablet by mouth Daily.  Dispense: 90 tablet; Refill: 3    2. Degeneration of lumbar intervertebral disc    3. Type 2 diabetes mellitus with diabetic neuropathy, with long-term current use of insulin  -     gabapentin (NEURONTIN) 100 MG capsule; Take 4 capsules by mouth Every Night.  Dispense: 120 capsule; Refill: 2  -     POC Glycosylated Hemoglobin (Hb A1C)  -     empagliflozin (JARDIANCE) 25 MG tablet tablet; Take 1 tablet by mouth Daily.  Dispense: 90 tablet; Refill: 3    4. Venous stasis  -     furosemide (LASIX) 40 MG tablet; Take 1 tablet by mouth Daily.  Dispense: 90 tablet; Refill: 3    5. Diabetic polyneuropathy associated with type 2 diabetes mellitus  -     furosemide (LASIX) 40 MG tablet; Take 1 tablet by mouth Daily.  Dispense: 90 tablet; Refill: 3  -     POC Glycosylated Hemoglobin (Hb A1C)    6. Venous insufficiency  Overview:  Previous eval by vascular thought to be venous insufficiency  Compression  Continue Lasix that he had significant worsening without Lasix         Paresthesias and pain to right great and second toe likely lumbar radiculopathy versus diabetic neuropathy.  Increase gabapentin to 400 mg at night.  If not working he can taper off of this.  Would not want to go too high on the gabapentin dose given CKD creatinine clearance about 40    Follow Up:   Return in about 3 months (around 9/7/2023) for Follow-up.      MDM:      Mario Posada MD  Family Medicine - [facility] [place]

## 2023-11-07 ENCOUNTER — TELEPHONE (OUTPATIENT)
Dept: FAMILY MEDICINE CLINIC | Facility: CLINIC | Age: 68
End: 2023-11-07
Payer: MEDICARE

## 2023-11-10 ENCOUNTER — TELEPHONE (OUTPATIENT)
Dept: FAMILY MEDICINE CLINIC | Facility: CLINIC | Age: 68
End: 2023-11-10

## 2023-11-10 NOTE — TELEPHONE ENCOUNTER
Pharmacy Name:     [facility] - [facility] [address] - [phone]  - [phone]      Pharmacy representative name:     RAFAEL    What medication are you calling in regards to:     INSULIN PEN NEEDLE (PEN NEEDLES) 31 G X 516 MM    What question does the pharmacy have:     PHARMACY STATED PATIENT REQUESTED THE PEN NEEDLES LISTED ABOVE BE PRESCRIBED AS A NEW PRESCRIPTION    Who is the provider that prescribed the medication:     DR JIMÉNEZ      
none

## 2024-04-08 NOTE — TELEPHONE ENCOUNTER
Spoke with pt about needing to be seen to discuss requested medication. He acknowledged understanding and he stated he has an appointment in June and wants to wait until then.   individual instruction

## 2024-07-01 ENCOUNTER — TELEPHONE (OUTPATIENT)
Dept: FAMILY MEDICINE CLINIC | Facility: CLINIC | Age: 69
End: 2024-07-01
Payer: MEDICARE

## 2024-07-01 RX ORDER — PANTOPRAZOLE SODIUM 20 MG/1
20 TABLET, DELAYED RELEASE ORAL DAILY
Qty: 90 TABLET | Refills: 0 | Status: SHIPPED | OUTPATIENT
Start: 2024-07-01

## 2024-07-01 RX ORDER — LEVOTHYROXINE SODIUM 0.1 MG/1
100 TABLET ORAL DAILY
Qty: 90 TABLET | Refills: 0 | Status: SHIPPED | OUTPATIENT
Start: 2024-07-01

## 2024-07-01 RX ORDER — FOLIC ACID 1 MG/1
1000 TABLET ORAL DAILY
Qty: 90 TABLET | Refills: 0 | Status: SHIPPED | OUTPATIENT
Start: 2024-07-01

## 2024-07-01 RX ORDER — AMLODIPINE BESYLATE 5 MG/1
5 TABLET ORAL 2 TIMES DAILY
Qty: 180 TABLET | Refills: 0 | Status: SHIPPED | OUTPATIENT
Start: 2024-07-01

## 2024-07-01 RX ORDER — LOSARTAN POTASSIUM 25 MG/1
25 TABLET ORAL DAILY
Qty: 90 TABLET | Refills: 0 | Status: SHIPPED | OUTPATIENT
Start: 2024-07-01

## 2024-07-01 NOTE — TELEPHONE ENCOUNTER
Patient notified of providers response message and verbalized understanding. Offered patient appointment and he said he would call back to schedule.

## 2024-07-01 NOTE — TELEPHONE ENCOUNTER
Call patient.  Has been over a year since he has been seen he needs to see me in the office for further refills 90-day supply given but no further refills without office visit labs

## 2024-07-17 RX ORDER — METOPROLOL TARTRATE 50 MG/1
50 TABLET, FILM COATED ORAL 2 TIMES DAILY
Qty: 180 TABLET | Refills: 3 | OUTPATIENT
Start: 2024-07-17

## 2024-09-16 RX ORDER — AMLODIPINE BESYLATE 5 MG/1
5 TABLET ORAL 2 TIMES DAILY
Qty: 180 TABLET | Refills: 3 | OUTPATIENT
Start: 2024-09-16

## 2024-09-16 RX ORDER — FOLIC ACID 1 MG/1
1000 TABLET ORAL DAILY
Qty: 90 TABLET | Refills: 3 | OUTPATIENT
Start: 2024-09-16

## 2024-09-16 RX ORDER — LOSARTAN POTASSIUM 25 MG/1
25 TABLET ORAL DAILY
Qty: 90 TABLET | Refills: 3 | OUTPATIENT
Start: 2024-09-16

## 2024-09-16 RX ORDER — PANTOPRAZOLE SODIUM 20 MG/1
20 TABLET, DELAYED RELEASE ORAL DAILY
Qty: 90 TABLET | Refills: 3 | OUTPATIENT
Start: 2024-09-16

## 2024-09-16 RX ORDER — LEVOTHYROXINE SODIUM 100 UG/1
100 TABLET ORAL DAILY
Qty: 90 TABLET | Refills: 3 | OUTPATIENT
Start: 2024-09-16

## 2024-10-08 DIAGNOSIS — I87.8 VENOUS STASIS: ICD-10-CM

## 2024-10-08 DIAGNOSIS — E11.42 DIABETIC POLYNEUROPATHY ASSOCIATED WITH TYPE 2 DIABETES MELLITUS: ICD-10-CM

## 2024-10-08 RX ORDER — FUROSEMIDE 40 MG
40 TABLET ORAL DAILY
Qty: 90 TABLET | Refills: 3 | OUTPATIENT
Start: 2024-10-08